# Patient Record
Sex: MALE | Race: BLACK OR AFRICAN AMERICAN | NOT HISPANIC OR LATINO | Employment: FULL TIME | URBAN - METROPOLITAN AREA
[De-identification: names, ages, dates, MRNs, and addresses within clinical notes are randomized per-mention and may not be internally consistent; named-entity substitution may affect disease eponyms.]

---

## 2017-01-11 ENCOUNTER — ANTI-COAG VISIT (OUTPATIENT)
Dept: CARDIOLOGY | Facility: CLINIC | Age: 52
End: 2017-01-11
Payer: MEDICARE

## 2017-01-11 DIAGNOSIS — Z79.01 LONG TERM (CURRENT) USE OF ANTICOAGULANTS: Primary | ICD-10-CM

## 2017-01-11 LAB
CTP QC/QA: NORMAL
INR PPP: 2.4 (ref 2–3)

## 2017-01-11 PROCEDURE — 99999 PR PBB SHADOW E&M-EST. PATIENT-LVL I: CPT | Mod: PBBFAC,,, | Performed by: PHARMACIST

## 2017-01-11 PROCEDURE — 85610 PROTHROMBIN TIME: CPT | Mod: PBBFAC | Performed by: PHARMACIST

## 2017-01-11 PROCEDURE — 99211 OFF/OP EST MAY X REQ PHY/QHP: CPT | Mod: PBBFAC | Performed by: PHARMACIST

## 2017-02-01 ENCOUNTER — ANTI-COAG VISIT (OUTPATIENT)
Dept: CARDIOLOGY | Facility: CLINIC | Age: 52
End: 2017-02-01
Payer: MEDICARE

## 2017-02-01 DIAGNOSIS — Z79.01 LONG TERM (CURRENT) USE OF ANTICOAGULANTS: Primary | ICD-10-CM

## 2017-02-01 LAB — INR PPP: 2.4 (ref 2–3)

## 2017-02-01 PROCEDURE — 99999 PR PBB SHADOW E&M-EST. PATIENT-LVL I: CPT | Mod: PBBFAC,,, | Performed by: PHARMACIST

## 2017-02-01 PROCEDURE — 99211 OFF/OP EST MAY X REQ PHY/QHP: CPT | Mod: PBBFAC | Performed by: PHARMACIST

## 2017-02-01 PROCEDURE — 85610 PROTHROMBIN TIME: CPT | Mod: PBBFAC | Performed by: PHARMACIST

## 2017-03-01 ENCOUNTER — ANTI-COAG VISIT (OUTPATIENT)
Dept: CARDIOLOGY | Facility: CLINIC | Age: 52
End: 2017-03-01
Payer: MEDICARE

## 2017-03-01 DIAGNOSIS — I42.9 CARDIOMYOPATHY, UNSPECIFIED TYPE: Primary | ICD-10-CM

## 2017-03-01 DIAGNOSIS — Z79.01 LONG TERM (CURRENT) USE OF ANTICOAGULANTS: Primary | ICD-10-CM

## 2017-03-01 LAB — INR PPP: 2.3 (ref 2–3)

## 2017-03-01 PROCEDURE — 85610 PROTHROMBIN TIME: CPT | Mod: PBBFAC | Performed by: PHARMACIST

## 2017-03-01 PROCEDURE — 99999 PR PBB SHADOW E&M-EST. PATIENT-LVL I: CPT | Mod: PBBFAC,,, | Performed by: PHARMACIST

## 2017-03-01 PROCEDURE — 99211 OFF/OP EST MAY X REQ PHY/QHP: CPT | Mod: PBBFAC | Performed by: PHARMACIST

## 2017-03-01 NOTE — TELEPHONE ENCOUNTER
----- Message from Ary Jessica sent at 3/1/2017 11:42 AM CST -----  Contact: pt   Pt came in to office to request refills on the following medications: 30 day supply and send to [facility] in [place] (on file)  Dr. Campos/ Rene    Hydralazine 25mg  Isosorbide 20mg    Pt can be reached @ [phone].  Pt scheduled an appt for 4/5/17 w/ Dr. Noe.  Thanks

## 2017-03-02 RX ORDER — HYDRALAZINE HYDROCHLORIDE 25 MG/1
25 TABLET, FILM COATED ORAL EVERY 8 HOURS
Qty: 90 TABLET | Refills: 3 | Status: SHIPPED | OUTPATIENT
Start: 2017-03-02 | End: 2017-04-05 | Stop reason: SDUPTHER

## 2017-03-02 RX ORDER — ISOSORBIDE DINITRATE 20 MG/1
20 TABLET ORAL 3 TIMES DAILY
Qty: 90 TABLET | Refills: 3 | Status: SHIPPED | OUTPATIENT
Start: 2017-03-02 | End: 2017-04-05 | Stop reason: SDUPTHER

## 2017-04-05 ENCOUNTER — HOSPITAL ENCOUNTER (OUTPATIENT)
Dept: CARDIOLOGY | Facility: CLINIC | Age: 52
Discharge: HOME | End: 2017-04-05
Payer: MEDICARE

## 2017-04-05 ENCOUNTER — TELEPHONE (OUTPATIENT)
Dept: CARDIOLOGY | Facility: CLINIC | Age: 52
End: 2017-04-05

## 2017-04-05 ENCOUNTER — ANTI-COAG VISIT (OUTPATIENT)
Dept: CARDIOLOGY | Facility: CLINIC | Age: 52
End: 2017-04-05
Payer: MEDICARE

## 2017-04-05 ENCOUNTER — OFFICE VISIT (OUTPATIENT)
Dept: CARDIOLOGY | Facility: CLINIC | Age: 52
End: 2017-04-05
Payer: MEDICARE

## 2017-04-05 VITALS
BODY MASS INDEX: 36.52 KG/M2 | HEART RATE: 92 BPM | SYSTOLIC BLOOD PRESSURE: 120 MMHG | WEIGHT: 269.63 LBS | HEIGHT: 72 IN | DIASTOLIC BLOOD PRESSURE: 84 MMHG

## 2017-04-05 DIAGNOSIS — E78.00 PURE HYPERCHOLESTEROLEMIA: ICD-10-CM

## 2017-04-05 DIAGNOSIS — Z79.01 ANTICOAGULATED ON COUMADIN: ICD-10-CM

## 2017-04-05 DIAGNOSIS — I63.412 CEREBROVASCULAR ACCIDENT (CVA) DUE TO EMBOLISM OF LEFT MIDDLE CEREBRAL ARTERY: ICD-10-CM

## 2017-04-05 DIAGNOSIS — I43 CARDIOMYOPATHY, HYPERTENSIVE, WITH HEART FAILURE: Primary | ICD-10-CM

## 2017-04-05 DIAGNOSIS — I10 ESSENTIAL HYPERTENSION: ICD-10-CM

## 2017-04-05 DIAGNOSIS — Z79.01 LONG TERM (CURRENT) USE OF ANTICOAGULANTS: Primary | ICD-10-CM

## 2017-04-05 DIAGNOSIS — I48.21 PERMANENT ATRIAL FIBRILLATION: ICD-10-CM

## 2017-04-05 DIAGNOSIS — Z79.01 LONG TERM (CURRENT) USE OF ANTICOAGULANTS: ICD-10-CM

## 2017-04-05 DIAGNOSIS — I11.0 CARDIOMYOPATHY, HYPERTENSIVE, WITH HEART FAILURE: Primary | ICD-10-CM

## 2017-04-05 DIAGNOSIS — I42.9 CARDIOMYOPATHY, UNSPECIFIED TYPE: ICD-10-CM

## 2017-04-05 LAB — INR PPP: 2.4 (ref 2–3)

## 2017-04-05 PROCEDURE — 85610 PROTHROMBIN TIME: CPT | Mod: PBBFAC

## 2017-04-05 PROCEDURE — 99211 OFF/OP EST MAY X REQ PHY/QHP: CPT | Mod: PBBFAC,25,27

## 2017-04-05 PROCEDURE — 99999 PR PBB SHADOW E&M-EST. PATIENT-LVL III: CPT | Mod: PBBFAC,,, | Performed by: INTERNAL MEDICINE

## 2017-04-05 PROCEDURE — 99214 OFFICE O/P EST MOD 30 MIN: CPT | Mod: S$PBB,,, | Performed by: INTERNAL MEDICINE

## 2017-04-05 PROCEDURE — 93010 ELECTROCARDIOGRAM REPORT: CPT | Mod: S$PBB,,, | Performed by: INTERNAL MEDICINE

## 2017-04-05 PROCEDURE — 99999 PR PBB SHADOW E&M-EST. PATIENT-LVL I: CPT | Mod: PBBFAC,,,

## 2017-04-05 RX ORDER — ISOSORBIDE DINITRATE 20 MG/1
20 TABLET ORAL 3 TIMES DAILY
Qty: 90 TABLET | Refills: 11 | Status: SHIPPED | OUTPATIENT
Start: 2017-04-05 | End: 2018-04-25 | Stop reason: SDUPTHER

## 2017-04-05 RX ORDER — HYDRALAZINE HYDROCHLORIDE 25 MG/1
25 TABLET, FILM COATED ORAL EVERY 8 HOURS
Qty: 90 TABLET | Refills: 11 | Status: SHIPPED | OUTPATIENT
Start: 2017-04-05 | End: 2018-05-09 | Stop reason: SDUPTHER

## 2017-04-05 RX ORDER — HYDROCHLOROTHIAZIDE 25 MG/1
25 TABLET ORAL DAILY
Qty: 30 TABLET | Refills: 11 | Status: SHIPPED | OUTPATIENT
Start: 2017-04-05 | End: 2018-04-25 | Stop reason: SDUPTHER

## 2017-04-05 RX ORDER — POTASSIUM CHLORIDE 20 MEQ/1
20 TABLET, EXTENDED RELEASE ORAL DAILY
Qty: 30 TABLET | Refills: 11 | Status: SHIPPED | OUTPATIENT
Start: 2017-04-05

## 2017-04-05 RX ORDER — LISINOPRIL 40 MG/1
40 TABLET ORAL DAILY
Qty: 30 TABLET | Refills: 11 | Status: SHIPPED | OUTPATIENT
Start: 2017-04-05 | End: 2018-04-25 | Stop reason: SDUPTHER

## 2017-04-05 RX ORDER — PRAVASTATIN SODIUM 40 MG/1
40 TABLET ORAL DAILY
Qty: 30 TABLET | Refills: 11 | Status: SHIPPED | OUTPATIENT
Start: 2017-04-05 | End: 2018-04-25 | Stop reason: SDUPTHER

## 2017-04-05 RX ORDER — CARVEDILOL 25 MG/1
12.5 TABLET ORAL 2 TIMES DAILY
Qty: 60 TABLET | Refills: 11 | Status: SHIPPED | OUTPATIENT
Start: 2017-04-05 | End: 2018-04-25 | Stop reason: SDUPTHER

## 2017-04-05 NOTE — TELEPHONE ENCOUNTER
----- Message from Demar Noe MD sent at 4/5/2017  1:02 PM CDT -----  Please contact and inform pt that labs look ok.

## 2017-04-05 NOTE — LETTER
April 5, 2017      Leander Mcmahan MD  [address]  [facility] [address]           [facility]  [address]  [facility] [address]  Phone: [phone]          Patient: Casandra Jaeger   MR Number: [id number]   YOB: 1965   Date of Visit: 4/5/2017       Dear Dr. Leander Mcmhaan:    Thank you for referring Casandra Jaeger to me for evaluation. Attached you will find relevant portions of my assessment and plan of care.    If you have questions, please do not hesitate to call me. I look forward to following Casandra Jaeger along with you.    Sincerely,    Demar Noe MD    Enclosure  CC:  No Recipients    If you would like to receive this communication electronically, please contact [email or web address] or [phone] to request more information on [facility] Link access.    For providers and/or their staff who would like to refer a patient to [facility], please contact us through our one-stop-shop provider referral line, [facility] , at [phone].    If you feel you have received this communication in error or would no longer like to receive these types of communications, please e-mail [email or web address]

## 2017-04-05 NOTE — PROGRESS NOTES
Subjective:   Patient ID:  Casandra Jaeger is a 51 y.o. male who presents for follow-up of Cardiomyopathy (1 yr f/u)      HPI: Pt is a 52 y/o man with a PMH sign for NICM (likely from HTN), permanent afib with CVA (with residual defect) and HPL.  With medical therapy his LVEF has increased from 35%-->50%.  He denies any exertional chest discomfort, exertional dyspnea, palpitations, TIA's, syncope or presyncope. He does not have a regimented exercise program, however does lead an active lifestyle and is not limited by any cardiac symptoms.      Vitals:    04/05/17 0820   BP: 120/84   BP Location: Left arm   Patient Position: Sitting   BP Method: Automatic   Pulse: 92   Weight: 122.3 kg (269 lb 10 oz)   Height: 6' (1.829 m)     Body mass index is 36.57 kg/(m^2).  CrCl cannot be calculated (Patient has no serum creatinine result on file.).    Lab Results   Component Value Date     06/05/2015    K 3.3 (L) 06/05/2015     06/05/2015    CO2 26 06/05/2015    BUN 15 06/05/2015    CREATININE 1.3 06/05/2015     06/05/2015    HGBA1C 5.9 07/31/2009    MG 2.3 08/05/2009    AST 18 11/11/2011    ALT 18 11/11/2011    ALBUMIN 3.7 11/11/2011    PROT 6.8 11/11/2011    BILITOT 1.3 (H) 11/11/2011    WBC 6.12 11/11/2011    HGB 14.4 11/11/2011    HCT 43.0 11/11/2011    MCV 83.3 11/11/2011     11/11/2011    INR 2.3 03/01/2017    INR 1.1 11/15/2011    TSH 0.857 07/31/2009    CHOL 120 07/31/2013    HDL 45 07/31/2013    LDLCALC 59.0 (L) 07/31/2013    TRIG 80 07/31/2013     Current Outpatient Prescriptions   Medication Sig    aspirin 81 mg Tab Take 81 mg by mouth. 1 Tablet Oral Every day    carvedilol (COREG) 25 MG tablet Take 0.5 tablets (12.5 mg total) by mouth 2 (two) times daily.    hydrALAZINE (APRESOLINE) 25 MG tablet Take 1 tablet (25 mg total) by mouth every 8 (eight) hours.    hydrochlorothiazide (HYDRODIURIL) 25 MG tablet Take 1 tablet (25 mg total) by mouth once daily.    isosorbide dinitrate (ISORDIL) 20  MG tablet Take 1 tablet (20 mg total) by mouth 3 (three) times daily.    lisinopril (PRINIVIL,ZESTRIL) 40 MG tablet Take 1 tablet (40 mg total) by mouth once daily.    potassium chloride SA (K-DUR,KLOR-CON) 20 MEQ tablet Take 1 tablet (20 mEq total) by mouth once daily.    pravastatin (PRAVACHOL) 40 MG tablet Take 1 tablet (40 mg total) by mouth once daily.    warfarin (COUMADIN) 2.5 MG tablet Take 2 to 3 tablets by mouth daily as directed by coumadin clinic     No current facility-administered medications for this visit.        Review of Systems   Constitution: Negative for decreased appetite, weakness, malaise/fatigue, weight gain and weight loss.   HENT: Negative for headaches.    Eyes: Negative for visual disturbance.   Cardiovascular: Negative for chest pain, claudication, dyspnea on exertion, irregular heartbeat, near-syncope, orthopnea, palpitations, paroxysmal nocturnal dyspnea and syncope.   Respiratory: Negative for cough, shortness of breath, sleep disturbances due to breathing and snoring.    Skin: Negative for rash.   Musculoskeletal: Negative for arthritis, muscle cramps, muscle weakness and myalgias.   Gastrointestinal: Negative for abdominal pain, anorexia, change in bowel habit, heartburn and nausea.   Genitourinary: Negative for dysuria, frequency and nocturia.   Neurological: Negative for difficulty with concentration, excessive daytime sleepiness, dizziness, light-headedness, loss of balance, numbness, paresthesias and vertigo.   Psychiatric/Behavioral: Negative for depression.       Objective:   Physical Exam   Constitutional: He is oriented to person, place, and time. He appears well-developed and well-nourished.   HENT:   Head: Normocephalic and atraumatic.   Eyes: Pupils are equal, round, and reactive to light.   Neck: Normal range of motion. Neck supple.   Cardiovascular: Normal rate, regular rhythm, normal heart sounds, intact distal pulses and normal pulses.  Exam reveals no gallop.     No murmur heard.  Pulmonary/Chest: Effort normal and breath sounds normal.   Abdominal: Soft. Bowel sounds are normal. There is no hepatosplenomegaly. There is no tenderness.   Musculoskeletal: Normal range of motion.   Neurological: He is alert and oriented to person, place, and time.   Skin: Skin is warm and dry.   Psychiatric: He has a normal mood and affect. His speech is normal and behavior is normal. Judgment and thought content normal.       Assessment:     1. Cardiomyopathy, hypertensive, with heart failure    2. Long term (current) use of anticoagulants    3. Essential hypertension    4. Cerebrovascular accident (CVA) due to embolism of left middle cerebral artery    5. Pure hypercholesterolemia    6. Permanent atrial fibrillation    7. Anticoagulated on Coumadin        Plan:   From a cardiac standpoint, pt is doing well and is clinically stable. Pt's BP's are at goal. Check routine labs.

## 2017-04-05 NOTE — MR AVS SNAPSHOT
[facility]  [address]  [facility] [address]  Phone: [phone]                  Casandra Jaeger   2017 9:30 AM   Office Visit    Description:  Male : 1965   Provider:  Demar Noe MD   Department:  [facility]           Reason for Visit     Cardiomyopathy           Diagnoses this Visit        Comments    Cardiomyopathy, hypertensive, with heart failure    -  Primary     Long term (current) use of anticoagulants         Essential hypertension         Cerebrovascular accident (CVA) due to embolism of left middle cerebral artery         Pure hypercholesterolemia         Permanent atrial fibrillation         Anticoagulated on Coumadin                To Do List           Future Appointments        Provider Department Dept Phone    2017 11:30 AM Loree Arambula, PharmD [facility] - Coumadin [phone]      Goals (5 Years of Data)     None      Follow-Up and Disposition     Return in about 1 year (around 2018).       These Medications        Disp Refills Start End    carvedilol (COREG) 25 MG tablet 60 tablet 11 2017     Take 0.5 tablets (12.5 mg total) by mouth 2 (two) times daily. - Oral    Ph[facility]: [facility] [address] Ph #: [phone]       hydrALAZINE (APRESOLINE) 25 MG tablet 90 tablet 2017     Take 1 tablet (25 mg total) by mouth every 8 (eight) hours. - Oral    Ph[facility]: [facility] [address] Ph #: [phone]       Notes to Pharmacy: Pt requesting 30 day supply.    hydrochlorothiazide (HYDRODIURIL) 25 MG tablet 30 tablet 11 2017     Take 1 tablet (25 mg total) by mouth once daily. - Oral    [facility]: [facility] [address] Ph #: [phone]       isosorbide dinitrate (ISORDIL) 20 MG tablet 90 tablet 2017     Take 1 tablet (20 mg total) by mouth 3 (three) times daily. - Oral    Ph[facility]acy: [facility] [address] [place] [address],  [place] - [address] [facility] #: [phone]       Notes to Pharmacy: Pt requesting a 30 day supply.    lisinopril (PRINIVIL,ZESTRIL) 40 MG tablet 30 tablet 11 4/5/2017     Take 1 tablet (40 mg total) by mouth once daily. - Oral    Ph[facility]acy: [facility] [address] [place] [address] [address] [facility] Ph #: [phone]       potassium chloride SA (K-DUR,KLOR-CON) 20 MEQ tablet 30 tablet 11 4/5/2017     Take 1 tablet (20 mEq total) by mouth once daily. - Oral    Ph[facility]acy: [facility] [address] [place] [address] [address] [facility] Ph #: [phone]       pravastatin (PRAVACHOL) 40 MG tablet 30 tablet 11 4/5/2017     Take 1 tablet (40 mg total) by mouth once daily. - Oral    Pharmacy: [facility] [address] [facility] Ph #: [phone]         [facility]     [facility] Nurse Care Line - 24/7 Assistance  Unless otherwise directed by your provider, please contact [facility], our nurse care line that is available for 24/7 assistance.     Registered nurses in the [facility] provide: appointment scheduling, clinical advisement, health education, and other advisory services.  Call: [phone] (toll free)               Medications           Message regarding Medications     Verify the changes and/or additions to your medication regime listed below are the same as discussed with your clinician today.  If any of these changes or additions are incorrect, please notify your healthcare provider.             Verify that the below list of medications is an accurate representation of the medications you are currently taking.  If none reported, the list may be blank. If incorrect, please contact your healthcare provider. Carry this list with you in case of emergency.           Current Medications     aspirin 81 mg Tab Take 81 mg by mouth. 1 Tablet Oral Every day    carvedilol (COREG) 25 MG tablet Take 0.5 tablets (12.5 mg total) by mouth 2 (two) times daily.    hydrALAZINE (APRESOLINE) 25  MG tablet Take 1 tablet (25 mg total) by mouth every 8 (eight) hours.    hydrochlorothiazide (HYDRODIURIL) 25 MG tablet Take 1 tablet (25 mg total) by mouth once daily.    isosorbide dinitrate (ISORDIL) 20 MG tablet Take 1 tablet (20 mg total) by mouth 3 (three) times daily.    lisinopril (PRINIVIL,ZESTRIL) 40 MG tablet Take 1 tablet (40 mg total) by mouth once daily.    potassium chloride SA (K-DUR,KLOR-CON) 20 MEQ tablet Take 1 tablet (20 mEq total) by mouth once daily.    pravastatin (PRAVACHOL) 40 MG tablet Take 1 tablet (40 mg total) by mouth once daily.    warfarin (COUMADIN) 2.5 MG tablet Take 2 to 3 tablets by mouth daily as directed by coumadin clinic           Clinical Reference Information           Your Vitals Were     BP Pulse Height Weight BMI    120/84 (BP Location: Left arm, Patient Position: Sitting, BP Method: Automatic) 92 6' (1.829 m) 122.3 kg (269 lb 10 oz) 36.57 kg/m2      Blood Pressure          Most Recent Value    Right Arm BP - Sitting  128/91    Left Arm BP - Sitting  120/84    BP  120/84      Allergies as of 4/5/2017     No Known Allergies      Immunizations Administered on Date of Encounter - 4/5/2017     None      Orders Placed During Today's Visit     Future Labs/Procedures Expected by Expires    Comprehensive metabolic panel  4/5/2017 (Approximate) 4/5/2018    Lipid panel  4/5/2017 (Approximate) 4/5/2018      [facility] Sign-Up     Activating your [facility] account is as easy as 1-2-3!     1) Visit [email or web address], select Sign Up Now, enter this activation code and your date of birth, then select Next.  [email or web address]  Expires: 5/20/2017  8:06 AM      2) Create a username and password to use when you visit [facility] in the future and select a security question in case you lose your password and select Next.    3) Enter your e-mail address and click Sign Up!    Additional Information  If you have questions, please e-mail [email or web address] or call [phone] to talk to our  [facility] staff. Remember, [facility] is NOT to be used for urgent needs. For medical emergencies, dial 911.         Language Assistance Services     ATTENTION: Language assistance services are available, free of charge. Please call [phone].      ATENCIÓN: Si habla melbaañol, tiene a mcclelland disposición servicios gratuitos de asistencia lingüística. Llame al [phone].     CHÚ Ý: N?u b?n nói Ti?ng Vi?t, có các d?ch v? h? tr? ngôn ng? mi?n phí dành cho b?n. G?i s? [phone].         [facility] complies with applicable Federal civil rights laws and does not discriminate on the basis of race, color, national origin, age, disability, or sex.

## 2017-04-07 ENCOUNTER — TELEPHONE (OUTPATIENT)
Dept: CARDIOLOGY | Facility: CLINIC | Age: 52
End: 2017-04-07

## 2017-05-17 ENCOUNTER — ANTI-COAG VISIT (OUTPATIENT)
Dept: CARDIOLOGY | Facility: CLINIC | Age: 52
End: 2017-05-17
Payer: MEDICARE

## 2017-05-17 DIAGNOSIS — Z79.01 LONG TERM (CURRENT) USE OF ANTICOAGULANTS: Primary | ICD-10-CM

## 2017-05-17 LAB — INR PPP: 2.4 (ref 2–3)

## 2017-05-17 PROCEDURE — 85610 PROTHROMBIN TIME: CPT | Mod: PBBFAC

## 2017-05-17 NOTE — PROGRESS NOTES
Patient therapeutic. Patient denies any changes in diet, health or medications. Denies overt complications. Endorses consistency with greens 2-3x/wk. Patient may resume current warfarin dose and follow-up in 6 weeks.

## 2017-06-28 ENCOUNTER — ANTI-COAG VISIT (OUTPATIENT)
Dept: CARDIOLOGY | Facility: CLINIC | Age: 52
End: 2017-06-28
Payer: MEDICARE

## 2017-06-28 DIAGNOSIS — I48.20 CHRONIC ATRIAL FIBRILLATION: ICD-10-CM

## 2017-06-28 DIAGNOSIS — Z79.01 LONG-TERM (CURRENT) USE OF ANTICOAGULANTS: ICD-10-CM

## 2017-06-28 DIAGNOSIS — Z79.01 LONG TERM (CURRENT) USE OF ANTICOAGULANTS: Primary | ICD-10-CM

## 2017-06-28 LAB — INR PPP: 1.7 (ref 2–3)

## 2017-06-28 PROCEDURE — 99211 OFF/OP EST MAY X REQ PHY/QHP: CPT | Mod: PBBFAC

## 2017-06-28 PROCEDURE — 99999 PR PBB SHADOW E&M-EST. PATIENT-LVL I: CPT | Mod: PBBFAC,,,

## 2017-06-28 PROCEDURE — 85610 PROTHROMBIN TIME: CPT | Mod: PBBFAC

## 2017-06-28 RX ORDER — WARFARIN 2.5 MG/1
TABLET ORAL
Qty: 100 TABLET | Refills: 3 | Status: SHIPPED | OUTPATIENT
Start: 2017-06-28

## 2017-06-28 NOTE — PROGRESS NOTES
INR subtherapeutic without overt complication, likely due to increased greens.  Patient denies other changes to affect INR.  Boost then rechallenge prior stable warfarin dose.  Monitor INR more closely.  Patient advised to contact clinic with any changes, questions, or concerns.

## 2017-07-26 ENCOUNTER — ANTI-COAG VISIT (OUTPATIENT)
Dept: CARDIOLOGY | Facility: CLINIC | Age: 52
End: 2017-07-26
Payer: MEDICARE

## 2017-07-26 DIAGNOSIS — Z79.01 LONG TERM (CURRENT) USE OF ANTICOAGULANTS: Primary | ICD-10-CM

## 2017-07-26 LAB — INR PPP: 2.5 (ref 2–3)

## 2017-07-26 PROCEDURE — 99211 OFF/OP EST MAY X REQ PHY/QHP: CPT | Mod: PBBFAC

## 2017-07-26 PROCEDURE — 85610 PROTHROMBIN TIME: CPT | Mod: PBBFAC

## 2017-07-26 PROCEDURE — 99999 PR PBB SHADOW E&M-EST. PATIENT-LVL I: CPT | Mod: PBBFAC,,,

## 2017-07-26 NOTE — PROGRESS NOTES
Today is follow up for subtherapeutic INR.  Patient decreased greens to 2-3 times/week and denies other changes to affect INR.  INR therapeutic.  Continue warfarin dose.  Repeat INR 5-6 weeks.  Patient advised to contact clinic with any changes, questions, or concerns.

## 2017-08-21 RX ORDER — HYDRALAZINE HYDROCHLORIDE 25 MG/1
TABLET, FILM COATED ORAL
Qty: 90 TABLET | Refills: 3 | Status: SHIPPED | OUTPATIENT
Start: 2017-08-21 | End: 2018-05-09 | Stop reason: SDUPTHER

## 2017-08-30 ENCOUNTER — ANTI-COAG VISIT (OUTPATIENT)
Dept: CARDIOLOGY | Facility: CLINIC | Age: 52
End: 2017-08-30
Payer: MEDICARE

## 2017-08-30 DIAGNOSIS — Z79.01 LONG TERM (CURRENT) USE OF ANTICOAGULANTS: Primary | ICD-10-CM

## 2017-08-30 LAB — INR PPP: 2.5 (ref 2–3)

## 2017-08-30 PROCEDURE — 99211 OFF/OP EST MAY X REQ PHY/QHP: CPT | Mod: PBBFAC | Performed by: PHARMACIST

## 2017-08-30 PROCEDURE — 99999 PR PBB SHADOW E&M-EST. PATIENT-LVL I: CPT | Mod: PBBFAC,,, | Performed by: PHARMACIST

## 2017-08-30 PROCEDURE — 85610 PROTHROMBIN TIME: CPT | Mod: PBBFAC | Performed by: PHARMACIST

## 2017-10-11 ENCOUNTER — ANTI-COAG VISIT (OUTPATIENT)
Dept: CARDIOLOGY | Facility: CLINIC | Age: 52
End: 2017-10-11
Payer: MEDICARE

## 2017-10-11 DIAGNOSIS — Z79.01 LONG-TERM (CURRENT) USE OF ANTICOAGULANTS: Primary | ICD-10-CM

## 2017-10-11 LAB — INR PPP: 2.3 (ref 2–3)

## 2017-10-11 PROCEDURE — 99211 OFF/OP EST MAY X REQ PHY/QHP: CPT | Mod: PBBFAC | Performed by: PHARMACIST

## 2017-10-11 PROCEDURE — 85610 PROTHROMBIN TIME: CPT | Mod: PBBFAC | Performed by: PHARMACIST

## 2017-10-11 PROCEDURE — 99999 PR PBB SHADOW E&M-EST. PATIENT-LVL I: CPT | Mod: PBBFAC,,, | Performed by: PHARMACIST

## 2017-11-27 ENCOUNTER — ANTI-COAG VISIT (OUTPATIENT)
Dept: CARDIOLOGY | Facility: CLINIC | Age: 52
End: 2017-11-27
Payer: MEDICARE

## 2017-11-27 DIAGNOSIS — Z79.01 LONG-TERM (CURRENT) USE OF ANTICOAGULANTS: Primary | ICD-10-CM

## 2017-11-27 LAB — INR PPP: 2.3 (ref 2–3)

## 2017-11-27 PROCEDURE — 85610 PROTHROMBIN TIME: CPT | Mod: PBBFAC

## 2018-01-24 ENCOUNTER — ANTI-COAG VISIT (OUTPATIENT)
Dept: CARDIOLOGY | Facility: CLINIC | Age: 53
End: 2018-01-24
Payer: MEDICARE

## 2018-01-24 DIAGNOSIS — Z79.01 LONG TERM (CURRENT) USE OF ANTICOAGULANTS: Primary | ICD-10-CM

## 2018-01-24 LAB — INR PPP: 2.2 (ref 2–3)

## 2018-01-24 PROCEDURE — 85610 PROTHROMBIN TIME: CPT | Mod: PBBFAC

## 2018-01-24 NOTE — PROGRESS NOTES
INR within normal range today. Patient with bruise on arm from use. Denies any bleeding or other changes. Patient is stable on this dose. He will continue this dose until follow-up in 6 weeks. I advised him to contact us with any changes or problems.

## 2018-01-24 NOTE — PROGRESS NOTES
Pt seen by Marlena KENNY. I have reviewed her documentation and agree with her assessment and plan.

## 2018-03-07 ENCOUNTER — ANTI-COAG VISIT (OUTPATIENT)
Dept: CARDIOLOGY | Facility: CLINIC | Age: 53
End: 2018-03-07
Payer: MEDICARE

## 2018-03-07 DIAGNOSIS — Z79.01 LONG TERM (CURRENT) USE OF ANTICOAGULANTS: Primary | ICD-10-CM

## 2018-03-07 LAB — INR PPP: 2.1 (ref 2–3)

## 2018-03-07 PROCEDURE — 85610 PROTHROMBIN TIME: CPT | Mod: PBBFAC

## 2018-03-07 NOTE — PROGRESS NOTES
Patient reports no bleeding or bruising, no new medications and no diet changes.  I reminded the patient to call with any problems, changes or questions before the next visit.  I have reviewed the student's initial findings and agree with their assessment.  I have personally spoken with and assessed the patient in clinic to devise care plan.

## 2018-03-19 DIAGNOSIS — Z12.11 COLON CANCER SCREENING: ICD-10-CM

## 2018-04-25 ENCOUNTER — ANTI-COAG VISIT (OUTPATIENT)
Dept: CARDIOLOGY | Facility: CLINIC | Age: 53
End: 2018-04-25
Payer: MEDICARE

## 2018-04-25 DIAGNOSIS — Z79.01 LONG TERM (CURRENT) USE OF ANTICOAGULANTS: Primary | ICD-10-CM

## 2018-04-25 LAB — INR PPP: 2.6 (ref 2–3)

## 2018-04-25 PROCEDURE — 99211 OFF/OP EST MAY X REQ PHY/QHP: CPT | Mod: S$PBB,,,

## 2018-04-25 PROCEDURE — 85610 PROTHROMBIN TIME: CPT | Mod: PBBFAC

## 2018-04-25 RX ORDER — LISINOPRIL 40 MG/1
TABLET ORAL
Qty: 90 TABLET | Refills: 3 | Status: SHIPPED | OUTPATIENT
Start: 2018-04-25 | End: 2018-05-03 | Stop reason: SDUPTHER

## 2018-04-25 RX ORDER — CARVEDILOL 25 MG/1
12.5 TABLET ORAL 2 TIMES DAILY
Qty: 90 TABLET | Refills: 3 | Status: SHIPPED | OUTPATIENT
Start: 2018-04-25

## 2018-04-25 RX ORDER — HYDRALAZINE HYDROCHLORIDE 25 MG/1
TABLET, FILM COATED ORAL
Qty: 270 TABLET | Refills: 3 | Status: SHIPPED | OUTPATIENT
Start: 2018-04-25

## 2018-04-25 RX ORDER — PRAVASTATIN SODIUM 40 MG/1
TABLET ORAL
Qty: 90 TABLET | Refills: 3 | Status: SHIPPED | OUTPATIENT
Start: 2018-04-25

## 2018-04-25 RX ORDER — HYDROCHLOROTHIAZIDE 25 MG/1
TABLET ORAL
Qty: 90 TABLET | Refills: 3 | Status: SHIPPED | OUTPATIENT
Start: 2018-04-25

## 2018-04-25 RX ORDER — ISOSORBIDE DINITRATE 20 MG/1
TABLET ORAL
Qty: 270 TABLET | Refills: 3 | Status: SHIPPED | OUTPATIENT
Start: 2018-04-25

## 2018-04-25 NOTE — PROGRESS NOTES
INR within therapeutic range today. Patient states that he may had a little less high vitamin K foods recently but does not intend on continuing this dietary changes. He denies any bleeding, bruising or other changes that may affect warfarin therapy. Will maintain current dose and monitor closely for upward trend. Patient advised to call coumadin clinic with any changes or concerns

## 2018-05-03 RX ORDER — LISINOPRIL 40 MG/1
40 TABLET ORAL DAILY
Qty: 90 TABLET | Refills: 3 | Status: SHIPPED | OUTPATIENT
Start: 2018-05-03

## 2018-05-09 ENCOUNTER — ANTI-COAG VISIT (OUTPATIENT)
Dept: CARDIOLOGY | Facility: CLINIC | Age: 53
End: 2018-05-09
Payer: MEDICARE

## 2018-05-09 ENCOUNTER — OFFICE VISIT (OUTPATIENT)
Dept: CARDIOLOGY | Facility: CLINIC | Age: 53
End: 2018-05-09
Payer: MEDICARE

## 2018-05-09 VITALS
BODY MASS INDEX: 39.68 KG/M2 | SYSTOLIC BLOOD PRESSURE: 133 MMHG | OXYGEN SATURATION: 97 % | HEIGHT: 72 IN | WEIGHT: 293 LBS | HEART RATE: 92 BPM | DIASTOLIC BLOOD PRESSURE: 97 MMHG

## 2018-05-09 DIAGNOSIS — I11.0 CARDIOMYOPATHY DUE TO HYPERTENSION, WITH HEART FAILURE: ICD-10-CM

## 2018-05-09 DIAGNOSIS — I43 CARDIOMYOPATHY DUE TO HYPERTENSION, WITH HEART FAILURE: ICD-10-CM

## 2018-05-09 DIAGNOSIS — I48.21 PERMANENT ATRIAL FIBRILLATION: ICD-10-CM

## 2018-05-09 DIAGNOSIS — I10 ESSENTIAL HYPERTENSION: ICD-10-CM

## 2018-05-09 DIAGNOSIS — E78.00 PURE HYPERCHOLESTEROLEMIA: ICD-10-CM

## 2018-05-09 DIAGNOSIS — Z79.01 LONG TERM (CURRENT) USE OF ANTICOAGULANTS: Primary | ICD-10-CM

## 2018-05-09 DIAGNOSIS — I48.21 PERMANENT ATRIAL FIBRILLATION: Primary | ICD-10-CM

## 2018-05-09 DIAGNOSIS — Z79.01 LONG TERM (CURRENT) USE OF ANTICOAGULANTS: ICD-10-CM

## 2018-05-09 DIAGNOSIS — I63.412 CEREBROVASCULAR ACCIDENT (CVA) DUE TO EMBOLISM OF LEFT MIDDLE CEREBRAL ARTERY: ICD-10-CM

## 2018-05-09 LAB — INR PPP: 2.4 (ref 2–3)

## 2018-05-09 PROCEDURE — 99214 OFFICE O/P EST MOD 30 MIN: CPT | Mod: PBBFAC | Performed by: INTERNAL MEDICINE

## 2018-05-09 PROCEDURE — 99214 OFFICE O/P EST MOD 30 MIN: CPT | Mod: S$PBB,,, | Performed by: INTERNAL MEDICINE

## 2018-05-09 PROCEDURE — 85610 PROTHROMBIN TIME: CPT | Mod: PBBFAC

## 2018-05-09 PROCEDURE — 99999 PR PBB SHADOW E&M-EST. PATIENT-LVL IV: CPT | Mod: PBBFAC,,, | Performed by: INTERNAL MEDICINE

## 2018-05-09 NOTE — PROGRESS NOTES
.INR within therapeutic range today. Patient denies any bleeding, bruising or other changes that would affect warfarin therapy. Will maintain current dose. Patient advised to call coumadin clinic with any changes or concerns.

## 2018-05-09 NOTE — PROGRESS NOTES
Subjective:   Patient ID:  Casandra Jaeger is a 52 y.o. male who presents for follow-up of Cardiomyopathy (1 yr f/u ) and Wheezing      HPI:   Pt is a 51 y/o man with a PMH sign for NICM (likely from HTN), permanent afib with CVA (with residual defect) and HPL.  With medical therapy his LVEF has increased from 35%-->50%.  He denies any exertional chest discomfort, exertional dyspnea, palpitations, TIA's, syncope or presyncope. He does not have a regimented exercise program, however does lead an active lifestyle and is not limited by any cardiac symptoms.  He has gained a significant amount of weight since last year.    He states he is moving to [facility] in less than 1 month.     Vitals:    05/09/18 0830   BP: (!) 133/97   BP Location: Left arm   Patient Position: Sitting   BP Method: Large (Automatic)   Pulse: 92   SpO2: 97%   Weight: 132.9 kg (292 lb 15.9 oz)   Height: 6' (1.829 m)     Body mass index is 39.74 kg/m².  CrCl cannot be calculated (Patient's most recent lab result is older than the maximum 7 days allowed.).    Lab Results   Component Value Date     04/05/2017    K 4.1 04/05/2017     04/05/2017    CO2 27 04/05/2017    BUN 18 04/05/2017    CREATININE 1.3 04/05/2017    GLU 86 04/05/2017    HGBA1C 5.9 07/31/2009    MG 2.3 08/05/2009    AST 21 04/05/2017    ALT 17 04/05/2017    ALBUMIN 3.7 04/05/2017    PROT 7.0 04/05/2017    BILITOT 1.6 (H) 04/05/2017    WBC 6.12 11/11/2011    HGB 14.4 11/11/2011    HCT 43.0 11/11/2011    MCV 83.3 11/11/2011     11/11/2011    INR 2.6 04/25/2018    INR 1.1 11/15/2011    TSH 0.857 07/31/2009    CHOL 108 (L) 04/05/2017    HDL 38 (L) 04/05/2017    LDLCALC 55.4 (L) 04/05/2017    TRIG 73 04/05/2017       Current Outpatient Prescriptions   Medication Sig    aspirin 81 mg Tab Take 81 mg by mouth. 1 Tablet Oral Every day    carvedilol (COREG) 25 MG tablet Take 0.5 tablets (12.5 mg total) by mouth 2 (two) times daily.    hydrALAZINE (APRESOLINE) 25 MG tablet  TAKE ONE TABLET BY MOUTH EVERY 8 HOURS    hydroCHLOROthiazide (HYDRODIURIL) 25 MG tablet TAKE ONE TABLET BY MOUTH ONCE DAILY    isosorbide dinitrate (ISORDIL) 20 MG tablet TAKE ONE TABLET BY MOUTH THREE TIMES DAILY    lisinopril (PRINIVIL,ZESTRIL) 40 MG tablet Take 1 tablet (40 mg total) by mouth once daily.    potassium chloride SA (K-DUR,KLOR-CON) 20 MEQ tablet Take 1 tablet (20 mEq total) by mouth once daily.    pravastatin (PRAVACHOL) 40 MG tablet TAKE ONE TABLET BY MOUTH ONCE DAILY    warfarin (COUMADIN) 2.5 MG tablet Take 2 to 3 tablets by mouth daily as directed by coumadin clinic     No current facility-administered medications for this visit.        Review of Systems   Constitution: Positive for weight gain. Negative for decreased appetite, weakness, malaise/fatigue and weight loss.   Eyes: Negative for visual disturbance.   Cardiovascular: Negative for chest pain, claudication, dyspnea on exertion, irregular heartbeat, orthopnea, palpitations, paroxysmal nocturnal dyspnea and syncope.   Respiratory: Negative for cough, shortness of breath and snoring.    Skin: Negative for rash.   Musculoskeletal: Negative for arthritis, muscle cramps, muscle weakness and myalgias.   Gastrointestinal: Negative for abdominal pain, anorexia, change in bowel habit and nausea.   Genitourinary: Negative for dysuria and frequency.   Neurological: Negative for excessive daytime sleepiness, dizziness, headaches, loss of balance and numbness.   Psychiatric/Behavioral: Negative for depression.       Objective:   Physical Exam   Constitutional: He is oriented to person, place, and time. He appears well-developed and well-nourished.   HENT:   Head: Normocephalic and atraumatic.   Eyes: Pupils are equal, round, and reactive to light.   Neck: Normal range of motion. Neck supple.   Cardiovascular: Normal rate, normal heart sounds, intact distal pulses and normal pulses.  An irregularly irregular rhythm present. Exam reveals no  gallop.    No murmur heard.  Pulmonary/Chest: Effort normal and breath sounds normal.   Abdominal: Soft. Bowel sounds are normal. There is no hepatosplenomegaly. There is no tenderness.   Musculoskeletal: Normal range of motion.   Neurological: He is alert and oriented to person, place, and time.   Skin: Skin is warm and dry.   Psychiatric: He has a normal mood and affect. His speech is normal and behavior is normal. Judgment and thought content normal.       Assessment:     1. Permanent atrial fibrillation    2. Cardiomyopathy due to hypertension, with heart failure - compensated.  LVEF increased to 50%    3. Pure hypercholesterolemia    4. Essential hypertension    5. Long term (current) use of anticoagulants    6. Cerebrovascular accident (CVA) due to embolism of left middle cerebral artery        Plan:   Pt is clinically stable and from a CHF standpoint, is well compensated.  BPs are slightly elevated today however I'm reluctant to make any changes as he is moving in a few weeks and will be establishing care with new cardiologists.  Last few visits, BPs were ~120/85.  For now continue current meds and establish care in [place].  Recommended weight loss

## 2018-05-09 NOTE — PROGRESS NOTES
Pt seen by Arelis KENNY. I have reviewed her initial findings and agree with her assessment and plan

## 2018-05-28 ENCOUNTER — ANTI-COAG VISIT (OUTPATIENT)
Dept: CARDIOLOGY | Facility: CLINIC | Age: 53
End: 2018-05-28
Payer: MEDICARE

## 2018-05-28 DIAGNOSIS — Z79.01 LONG TERM (CURRENT) USE OF ANTICOAGULANTS: Primary | ICD-10-CM

## 2018-05-28 DIAGNOSIS — I48.21 PERMANENT ATRIAL FIBRILLATION: ICD-10-CM

## 2018-05-28 LAB — INR PPP: 2.2 (ref 2–3)

## 2018-05-28 PROCEDURE — 85610 PROTHROMBIN TIME: CPT | Mod: PBBFAC

## 2018-05-28 NOTE — LETTER
[facility]  [address]  [facility] [address]  Phone: [phone]  Fax: [phone]     2019         Casandra Jaeger  [address]  [address] [place] [id number]    Patient: Casandra Jaeger  MRN: [id number]  : 1965    Dear Casandra Jaeger:  Our records indicate an appointment was missed to have a PT/INR checked.  We have made multiple attempts to contact you by phone and were unsuccessful.  It is extremely important that our clinic is contacted immediately in order to reschedule the missed appointment, as it is important that you are monitored regularly while taking Coumadin (warfarin).  If monitoring by our clinic is no longer required, please call and inform our clinic so that we may update our records and discontinue further attempts to reach you.      Please call the Coumadin Clinic immediately upon receiving this letter, failure to do so may result in discharge from the clinic.  No response to this letter within 7 days will result in discharge from our clinic.      Contact information for the Coumadin Clinic:    Phone: [phone]    Fax: [phone]      Thank You,    [facility] Staff

## 2018-05-28 NOTE — LETTER
March 7, 2019    [address]  [address]  [address] [place] [address]      Dear Mr. Jaeger:    Please contact the office to schedule an appointment with [facility]. You may contact our office at [phone], Monday - Friday, 8am - 5pm to schedule an appointment. It is very important to schedule an appointment to check your INR levels.       Sincerely,    Your Healthcare Team

## 2018-05-28 NOTE — PROGRESS NOTES
.INR within therapeutic range today. Patient denies any bleeding, bruising or other changes that would affect warfarin therapy. Will maintain current dose. Patient advised to call coumadin clinic with any changes or concerns. Patient states that he is moving to [place] on Monday and will be finding a lab to have his PT/INR drawn. Patient states that he will call us with the lab info and would like to be telemanaged by us until he finds a new clinic.

## 2018-05-28 NOTE — PROGRESS NOTES
I have reviewed the nurse's initial findings and agree with their assessment.  I have  assessed the patient in clinic to devise care plan.

## 2018-06-11 ENCOUNTER — ANTI-COAG VISIT (OUTPATIENT)
Dept: CARDIOLOGY CLINIC | Age: 53
End: 2018-06-11

## 2018-06-11 ENCOUNTER — OFFICE VISIT (OUTPATIENT)
Dept: CARDIOLOGY CLINIC | Age: 53
End: 2018-06-11

## 2018-06-11 VITALS
HEART RATE: 72 BPM | OXYGEN SATURATION: 98 % | WEIGHT: 284 LBS | SYSTOLIC BLOOD PRESSURE: 132 MMHG | DIASTOLIC BLOOD PRESSURE: 91 MMHG | HEIGHT: 72 IN | BODY MASS INDEX: 38.47 KG/M2

## 2018-06-11 DIAGNOSIS — I48.91 ATRIAL FIBRILLATION, UNSPECIFIED TYPE (HCC): Primary | ICD-10-CM

## 2018-06-11 LAB
INR BLD: 2.2
PT POC: NORMAL SECONDS
VALID INTERNAL CONTROL?: YES

## 2018-06-11 RX ORDER — CARVEDILOL 25 MG/1
12.5 TABLET ORAL 2 TIMES DAILY
COMMUNITY
Start: 2018-03-07 | End: 2018-08-30 | Stop reason: SDUPTHER

## 2018-06-11 RX ORDER — HYDRALAZINE HYDROCHLORIDE 25 MG/1
25 TABLET, FILM COATED ORAL 3 TIMES DAILY
COMMUNITY
Start: 2018-04-25 | End: 2019-11-08 | Stop reason: SDUPTHER

## 2018-06-11 RX ORDER — ASPIRIN 81 MG/1
81 TABLET ORAL DAILY
COMMUNITY
End: 2021-12-02 | Stop reason: SDUPTHER

## 2018-06-11 RX ORDER — HYDROCHLOROTHIAZIDE 25 MG/1
TABLET ORAL
COMMUNITY
Start: 2018-04-25 | End: 2021-12-01

## 2018-06-11 RX ORDER — PRAVASTATIN SODIUM 40 MG/1
TABLET ORAL
COMMUNITY
Start: 2018-04-25 | End: 2021-12-01

## 2018-06-11 RX ORDER — ISOSORBIDE DINITRATE 20 MG/1
20 TABLET ORAL 3 TIMES DAILY
COMMUNITY
Start: 2018-06-08 | End: 2018-11-20 | Stop reason: ALTCHOICE

## 2018-06-11 RX ORDER — LISINOPRIL 40 MG/1
TABLET ORAL
COMMUNITY
Start: 2018-05-01 | End: 2021-12-02

## 2018-06-11 RX ORDER — WARFARIN 2.5 MG/1
TABLET ORAL
COMMUNITY
Start: 2018-05-29 | End: 2018-10-30 | Stop reason: SDUPTHER

## 2018-06-11 NOTE — PATIENT INSTRUCTIONS
Brandee Carroll will call to schedule echo - Done in [address] [facility] at [facility]

## 2018-06-11 NOTE — PROGRESS NOTES
Cardiovascular Specialists    Mr. Kaushik Boo is a 46year old male with a history of atrial fibrillation, CVA, hypertension, hyperlipidemia and obesity. Mr. Kaushik Boo is here to establish care with me. Mr. Kaushik Boo was diagnosed with atrial fibrillation in 1997. Initially he was not compliant with his medication, especially Coumadin. In 2009 he had a stroke. Since then he's been taking his Coumadin regularly. He has recently relocated to this area from [place]. He used to see a cardiologist there. He said he has some stable dyspnea on exertion for the last couple years. He said he can walk probably 3-4 blocks and after that he gets a little short of breath, however this appears to be somewhat unchanged. He also has gained almost 50 lbs over the last couple years. He denies any presyncope or syncope. He denies any palpitations. He denies any chest pain or chest tightness. He denies any bleeding side effect from Coumadin. Denies any nausea, vomiting, abdominal pain, fever, chills, sputum production. No hematuria or other bleeding complaints    Past Medical History:   Diagnosis Date    [facility]     CVA (cerebral vascular accident) ([facility] 2009    HLD (hyperlipidemia)     HTN (hypertension)     Obesity          No past surgical history on file. Current Outpatient Prescriptions   Medication Sig    carvedilol (COREG) 25 mg tablet 12.5 mg two (2) times a day.  hydrALAZINE (APRESOLINE) 25 mg tablet three (3) times daily.  hydroCHLOROthiazide (HYDRODIURIL) 25 mg tablet     isosorbide dinitrate (ISORDIL) 20 mg tablet 20 mg three (3) times daily.  lisinopril (PRINIVIL, ZESTRIL) 40 mg tablet     pravastatin (PRAVACHOL) 40 mg tablet     warfarin (COUMADIN) 2.5 mg tablet     aspirin delayed-release 81 mg tablet Take  by mouth daily. No current facility-administered medications for this visit.         Allergies and Sensitivities:  No Known Allergies    Family History:  No family history on file. Social History:  Social History   Substance Use Topics    Smoking status: Not on file    Smokeless tobacco: Not on file    Alcohol use Not on file     He  has no tobacco history on file. He  has no alcohol history on file. Review of Systems:  Cardiac symptoms as noted above in HPI. All others negative. Denies fatigue, malaise, skin rash, joint pain, blurring vision, photophobia, neck pain, hemoptysis, chronic cough, nausea, vomiting, hematuria, burning micturition, BRBPR, chronic headaches. Physical Exam:  BP Readings from Last 3 Encounters:   06/11/18 (!) 132/91         Pulse Readings from Last 3 Encounters:   06/11/18 72          Wt Readings from Last 3 Encounters:   06/11/18 284 lb (128.8 kg)       Constitutional: Oriented to person, place, and time. HENT: Head: Normocephalic and atraumatic. Eyes: Conjunctivae and extraocular motions are normal.   Neck: No JVD present. Carotid bruit is not appreciated. Cardiovascular: Irregular rhythm. No murmur, gallop or rubs appreciated  Lung: Breath sounds normal. No respiratory distress. No ronchi or rales appreciated  Abdominal: No tenderness. No rebound and no guarding. Musculoskeletal: There is no lower extremity edema. No cynosis  Lymphadenopathy:  No cervical or supraclavicular adenopathy appriciated. Neurological: No gross motor deficit noted. Skin: No visible skin rash noted. No Ear discharge noted  Psychiatric: Normal mood and affect. Good distal pulse    Review of Data  LABS:   No results found for: NA, K, CL, CO2, GLU, BUN, CREA  No flowsheet data found. No results found for: GPT, ALT  No results found for: HBA1C, HGBE8, QZP3ZJKE, STV2XKYJ    EKG  (06/18) A.fib at 82 bpm.     ECHO    IMPRESSION & PLAN:  Mr. Binta Hines is a 46year old male with a history of atrial fibrillation, CVA, hypertension, hyperlipidemia and obesity. Atrial fibrillation:   This was diagnosed in 1997. This was complicated by a stroke in 2009 as patient was not compliant with Coumadin initially. Currently he's on aspirin and Coumadin. INR today is 2.2. I'm not sure why he's on both aspirin and Coumadin. I'm going to try to obtain records from cardiologist while he was in [place]. He used to see Dr. Rosalva Kate at [facility]. We are going to request records. His heart rate is well controlled. Currently he is taking Coreg. I recommend to continue same. Hypertension: His blood pressure is 132/91 mmHg. He is on Coreg, Hydralazine, Hydrochlorothiazide and Lisinopril, as well as Isosorbide. I am going to continue same. An echocardiogram has been ordered to rule out hypertensive cardiovascular heart disease in the setting of dyspnea to make sure there is no LV dysfunction. Hyperlipidemia:  He is on Pravastatin 40 mg daily. We will obtain fasting lipid profile before next visit. I would recommend to continue same. Obesity:  Mr. Chepe Peralta weighs 284 lbs with BMI of 38. He said he's gained some weight because of sedentary lifestyle. I have advised him to start working on his diet and exercise to lose weight. Goal weight loss of 50 lbs has been discussed. He is going to start working out. Importance of diet and exercise was discussed with patient. This plan was discussed with patient who is in agreement. Thank you for allowing me to participate in patient care. Please feel free to call me if you have any question or concern. Amauri Dixon MD  Please note: This document has been produced using voice recognition software. Unrecognized errors in transcription may be present.

## 2018-06-11 NOTE — MR AVS SNAPSHOT
[address] [facility] [address] [address] 
[phone] Patient: Lilian Mata MRN: [id number] :1965 Visit Information Date & Time Provider Department Dept. Phone Encounter #  
 2018  9:30 AM Pt [address] Fern Christie Specialist at [place] [address] [id number] Your Appointments 2018  9:30 AM  
ANTICOAG NURSE with Pt Inr Norf Csi Cardio Specialist at [facility]: [facility] weeks [facility] [address] Upcoming Health Maintenance Date Due Hepatitis C Screening 1965 DTaP/Tdap/Td series (1 - Tdap) 1986 FOBT Q 1 YEAR AGE 50-75 2015 Influenza Age 5 to Adult 2018 Allergies as of 2018  Review Complete On: 2018 By: Sury Ahumada RN No Known Allergies Current Immunizations  Never Reviewed No immunizations on file. Not reviewed this visit You Were Diagnosed With   
  
 Codes Comments Atrial fibrillation, unspecified type [facility]    -  Primary ICD-10-CM: I48.91 
ICD-9-CM: 427.31 Your Updated Medication List  
  
   
This list is accurate as of 18 10:28 AM.  Always use your most recent med list.  
  
  
  
  
 aspirin delayed-release 81 mg tablet Take  by mouth daily. carvedilol 25 mg tablet Commonly known as:  COREG  
12.5 mg two (2) times a day. hydrALAZINE 25 mg tablet Commonly known as:  APRESOLINE  
three (3) times daily. hydroCHLOROthiazide 25 mg tablet Commonly known as:  HYDRODIURIL  
  
 isosorbide dinitrate 20 mg tablet Commonly known as:  ISORDIL  
20 mg three (3) times daily. lisinopril 40 mg tablet Commonly known as:  PRINIVIL, ZESTRIL  
  
 pravastatin 40 mg tablet Commonly known as:  PRAVACHOL  
  
 warfarin 2.5 mg tablet Commonly known as:  COUMADIN Description Verbal order and read back per Dr. Hurt More Take 7.5mg daily except 5mg on Sunday's June 2018 Details Sun Mon Tue Wed Thu Fri Sat  
       1  
  
  
  
   2  
  
  
  
  
  3  
  
  
  
   4  
  
  
  
   5  
  
  
  
   6  
  
  
  
   7  
  
  
  
   8  
  
  
  
   9  
  
  
  
  
  10  
  
  
  
   11  
  
7.5 mg  
See details 12  
  
7.5 mg  
  
   13  
  
7.5 mg  
  
   14  
  
7.5 mg  
  
   15  
  
7.5 mg  
  
   16  
  
7.5 mg  
  
  
  17  
  
5 mg  
  
   18  
  
7.5 mg  
  
   19  
  
7.5 mg  
  
   20  
  
7.5 mg  
  
   21  
  
7.5 mg  
  
   22  
  
7.5 mg  
  
   23  
  
7.5 mg  
  
  
  24  
  
5 mg  
  
   25  
  
7.5 mg  
  
   26  
  
7.5 mg  
  
   27  
  
7.5 mg  
  
   28  
  
7.5 mg  
  
   29  
  
7.5 mg  
  
   30  
  
7.5 mg Date Details 06/11 This INR check INR: 2.2 How to take your warfarin dose To take:  5 mg Take two of the 2.5 mg tablets. To take:  7.5 mg Take three of the 2.5 mg tablets. July 2018 Details Sun Mon Tue Wed Thu Fri Sat  
  1  
  
5 mg  
  
   2  
  
7.5 mg  
  
   3  
  
  
  
   4  
  
  
  
   5  
  
  
  
   6  
  
  
  
   7  
  
  
  
  
  8  
  
  
  
   9  
  
  
  
   10  
  
  
  
   11  
  
  
  
   12  
  
  
  
   13  
  
  
  
   14  
  
  
  
  
  15  
  
  
  
   16  
  
  
  
   17  
  
  
  
   18  
  
  
  
   19  
  
  
  
   20  
  
  
  
   21  
  
  
  
  
  22  
  
  
  
   23  
  
  
  
   24  
  
  
  
   25  
  
  
  
   26  
  
  
  
   27  
  
  
  
   28  
  
  
  
  
  29  
  
  
  
   30  
  
  
  
   31  
  
  
  
      
 Date Details No additional details Date of next INR:  7/2/2018 How to take your warfarin dose To take:  5 mg Take two of the 2.5 mg tablets. To take:  7.5 mg Take three of the 2.5 mg tablets. We Performed the Following AMB POC PT/INR [04947 CPT(R)] Introducing [facility] & HEALTH SERVICES! [facility] introduces Keep[employer]ipes patient portal. Now you can access parts of your medical record, email your doctor's office, and request medication refills online. 1. In your internet browser, go to [email or web address] 2. Click on the First Time User? Click Here link in the Sign In box. You will see the New Member Sign Up page. 3. Enter your KeepRecipes Access Code exactly as it appears below. You will not need to use this code after youve completed the sign-up process. If you do not sign up before the expiration date, you must request a new code. · Keep[employer]ipes Access Code: [email or web address] Expires: 9/9/2018 10:13 AM 
 
4. Enter the last four digits of your Social Security Number (xxxx) and Date of Birth (mm/dd/yyyy) as indicated and click Submit. You will be taken to the next sign-up page. 5. Create a KeepRecipes ID. This will be your KeepRecipes login ID and cannot be changed, so think of one that is secure and easy to remember. 6. Create a KeepRecipes password. You can change your password at any time. 7. Enter your Password Reset Question and Answer. This can be used at a later time if you forget your password. 8. Enter your e-mail address. You will receive e-mail notification when new information is available in [address] 9. Click Sign Up. You can now view and download portions of your medical record. 10. Click the Download Summary menu link to download a portable copy of your medical information. If you have questions, please visit the Frequently Asked Questions section of the KeepRecipes website. Remember, KeepRecipes is NOT to be used for urgent needs. For medical emergencies, dial 911. Now available from your iPhone and Android! Please provide this summary of care documentation to your next provider. If you have any questions after today's visit, please call [phone].

## 2018-06-11 NOTE — MR AVS SNAPSHOT
[address] [facility] [address] [address] 
[phone] Patient: Krista Vale MRN: [id number] :1965 Visit Information Date & Time Provider Department Dept. Phone Encounter #  
 2018 10:00 AM Black Weiss  [facility] Specialist at [facility] [phone] [id number] Follow-up Instructions Return in about 3 months (around 2018). Your Appointments 2018  9:30 AM  
ANTICOAG NURSE with Pt Inr Norf Csi Cardio Specialist at [facility]) Appt Note: 3 weeks [facility] [address] Upcoming Health Maintenance Date Due Hepatitis C Screening 1965 DTaP/Tdap/Td series (1 - Tdap) 1986 FOBT Q 1 YEAR AGE 50-75 2015 Influenza Age 5 to Adult 2018 Allergies as of 2018  Review Complete On: 2018 By: Alma Ocampo RN No Known Allergies Current Immunizations  Never Reviewed No immunizations on file. Not reviewed this visit You Were Diagnosed With   
  
 Codes Comments Atrial fibrillation, unspecified type ([facility]    -  Primary ICD-10-CM: I48.91 
ICD-9-CM: 427.31 Vitals BP Pulse Height(growth percentile) Weight(growth percentile) SpO2 BMI  
 (!) 132/91 72 6' (1.829 m) 284 lb (128.8 kg) 98% 38.52 kg/m2 BMI and BSA Data Body Mass Index Body Surface Area 38.52 kg/m 2 2.56 m 2 Your Updated Medication List  
  
   
This list is accurate as of 18 10:28 AM.  Always use your most recent med list.  
  
  
  
  
 aspirin delayed-release 81 mg tablet Take  by mouth daily. carvedilol 25 mg tablet Commonly known as:  COREG  
12.5 mg two (2) times a day. hydrALAZINE 25 mg tablet Commonly known as:  APRESOLINE  
three (3) times daily. hydroCHLOROthiazide 25 mg tablet Commonly known as:  HYDRODIURIL  
  
 isosorbide dinitrate 20 mg tablet Commonly known as:  ISORDIL  
20 mg three (3) times daily. lisinopril 40 mg tablet Commonly known as:  PRINIVIL, ZESTRIL  
  
 pravastatin 40 mg tablet Commonly known as:  PRAVACHOL  
  
 warfarin 2.5 mg tablet Commonly known as:  COUMADIN We Performed the Following AMB POC EKG ROUTINE W/ 12 LEADS, INTER & REP [67008 CPT(R)] Follow-up Instructions Return in about 3 months (around 9/11/2018). Patient Instructions Jaclyn will call to schedule echo - Done in Building [facility] at [facility] Introducing [facility] [place] & HEALTH SERVICES! [facility] introduces [employer] patient portal. Now you can access parts of your medical record, email your doctor's office, and request medication refills online. 1. In your internet browser, go to [email or web address] 2. Click on the First Time User? Click Here link in the Sign In box. You will see the New Member Sign Up page. 3. Enter your Push Health Access Code exactly as it appears below. You will not need to use this code after youve completed the sign-up process. If you do not sign up before the expiration date, you must request a new code. · [employer] Access Code: [email or web address] Expires: 9/9/2018 10:13 AM 
 
4. Enter the last four digits of your Social Security Number (xxxx) and Date of Birth (mm/dd/yyyy) as indicated and click Submit. You will be taken to the next sign-up page. 5. Create a Narratot ID. This will be your Push Health login ID and cannot be changed, so think of one that is secure and easy to remember. 6. Create a [employer] password. You can change your password at any time. 7. Enter your Password Reset Question and Answer. This can be used at a later time if you forget your password. 8. Enter your e-mail address.  You will receive e-mail notification when new information is available in [employer]. 9. Click Sign Up. You can now view and download portions of your medical record. 10. Click the Download Summary menu link to download a portable copy of your medical information. If you have questions, please visit the Frequently Asked Questions section of the [employer]o website. Remember, [employer]o is NOT to be used for urgent needs. For medical emergencies, dial 911. Now available from your iPhone and Android! Please provide this summary of care documentation to your next provider. If you have any questions after today's visit, please call [phone].

## 2018-07-02 ENCOUNTER — ANTI-COAG VISIT (OUTPATIENT)
Dept: CARDIOLOGY CLINIC | Age: 53
End: 2018-07-02

## 2018-07-02 DIAGNOSIS — I48.91 ATRIAL FIBRILLATION, UNSPECIFIED TYPE (HCC): ICD-10-CM

## 2018-07-02 LAB
INR BLD: 2.4
PT POC: NORMAL SECONDS
VALID INTERNAL CONTROL?: YES

## 2018-07-02 NOTE — MR AVS SNAPSHOT
[address] [facility] [address] [address] 
[phone] Patient: Naomi Carrizales MRN: [id number] :1965 Visit Information Date & Time Provider Department Dept. Phone Encounter #  
 2018  9:30 AM Pt [address] Fern Christie Specialist at [place] [address] [id number] Your Appointments 2018  9:30 AM  
ANTICOAG NURSE with Pt Inr Norf Csi Cardio Specialist at [address]) Appt Note: 3 weeks [facility] [address] [phone]  
  
   
 [facility] [address]  
  
    
 2018  9:20 AM  
ANTICOAG NURSE with Pt Inr Norf Csi Cardio Specialist at [address]) Appt Note: 4 weeks [facility] [address] [address]  
[phone] Upcoming Health Maintenance Date Due Hepatitis C Screening 1965 DTaP/Tdap/Td series (1 - Tdap) 1986 FOBT Q 1 YEAR AGE 50-75 2015 MEDICARE YEARLY EXAM 2018 Influenza Age 5 to Adult 2018 Allergies as of 2018  Review Complete On: 2018 By: Pastor Stephenie RN No Known Allergies Current Immunizations  Never Reviewed No immunizations on file. Not reviewed this visit You Were Diagnosed With   
  
 Codes Comments Atrial fibrillation, unspecified type ([facility]     ICD-10-CM: I48.91 
ICD-9-CM: 427.31 Your Updated Medication List  
  
   
This list is accurate as of 18  9:20 AM.  Always use your most recent med list.  
  
  
  
  
 aspirin delayed-release 81 mg tablet Take  by mouth daily. carvedilol 25 mg tablet Commonly known as:  COREG  
12.5 mg two (2) times a day. hydrALAZINE 25 mg tablet Commonly known as:  APRESOLINE  
three (3) times daily. hydroCHLOROthiazide 25 mg tablet Commonly known as:  HYDRODIURIL  
  
 isosorbide dinitrate 20 mg tablet Commonly known as:  ISORDIL  
20 mg three (3) times daily. lisinopril 40 mg tablet Commonly known as:  PRINIVIL, ZESTRIL  
  
 pravastatin 40 mg tablet Commonly known as:  PRAVACHOL  
  
 warfarin 2.5 mg tablet Commonly known as:  COUMADIN Description Verbal order and read back per Dr. Fred Lua Take 7.5mg daily except 5mg on Sunday's July 2018 Details Sissy Buster Tue Wed Thu Fri Sat  
  1  
  
  
  
   2  
  
7.5 mg  
See details 3  
  
7.5 mg  
  
   4  
  
7.5 mg  
  
   5  
  
7.5 mg  
  
   6  
  
7.5 mg  
  
   7  
  
7.5 mg  
  
  
  8  
  
5 mg  
  
   9  
  
7.5 mg  
  
   10  
  
7.5 mg  
  
   11  
  
7.5 mg  
  
   12  
  
7.5 mg  
  
   13  
  
7.5 mg  
  
   14  
  
7.5 mg  
  
  
  15  
  
5 mg  
  
   16  
  
7.5 mg  
  
   17  
  
7.5 mg  
  
   18  
  
7.5 mg  
  
   19  
  
7.5 mg  
  
   20  
  
7.5 mg  
  
   21  
  
7.5 mg  
  
  
  22  
  
5 mg  
  
   23  
  
7.5 mg  
  
   24  
  
7.5 mg  
  
   25  
  
7.5 mg  
  
   26  
  
7.5 mg  
  
   27  
  
7.5 mg  
  
   28  
  
7.5 mg  
  
  
  29  
  
5 mg  
  
   30  
  
7.5 mg  
  
   31 Date Details 07/02 This INR check INR: 2.4 Date of next INR:  7/30/2018 How to take your warfarin dose To take:  5 mg Take two of the 2.5 mg tablets. To take:  7.5 mg Take three of the 2.5 mg tablets. We Performed the Following AMB POC PT/INR [99802 CPT(R)] To-Do List   
 07/06/2018 2:00 PM  
  Appointment with [address] [place] [address] 7000 [facility] CV SERV at [address] [place] [address] NON-INVASIVE [address] [place] [address] ([phone]) Patient needs to bring a current list of all medications  No preparation is required for this study  This study requires patient to bring a written physicians order or the MD office may fax the order to Central Scheduling at [phone].   This exam is performed in the [address] [place] [address] at [facility] in the echo department. Please have the patient arrive 15 minutes prior to their schedule appointment time and report to Patient Registration at the main entrance of the hospital.     AGE LIMIT for this procedure at [facility] is 25years old. All patients under 25years of age should be referred to [facility]. Introducing \[facility] [place] [facility]\"" & HEALTH SERVICES! [employer] introduces [employer] patient portal. Now you can access parts of your medical record, email your doctor's office, and request medication refills online. 1. In your internet browser, go to [email or web address] 2. Click on the First Time User? Click Here link in the Sign In box. You will see the New Member Sign Up page. 3. Enter your [employer] Access Code exactly as it appears below. You will not need to use this code after youve completed the sign-up process. If you do not sign up before the expiration date, you must request a new code. · [employer] Access Code: [email or web address] Expires: 9/9/2018 10:13 AM 
 
4. Enter the last four digits of your Social Security Number (xxxx) and Date of Birth (mm/dd/yyyy) as indicated and click Submit. You will be taken to the next sign-up page. 5. Create a [employer] ID. This will be your [employer] login ID and cannot be changed, so think of one that is secure and easy to remember. 6. Create a [employer] password. You can change your password at any time. 7. Enter your Password Reset Question and Answer. This can be used at a later time if you forget your password. 8. Enter your e-mail address. You will receive e-mail notification when new information is available in [address] 9. Click Sign Up. You can now view and download portions of your medical record. 10. Click the Download Summary menu link to download a portable copy of your medical information.  
 
If you have questions, please visit the Frequently Asked Questions section of the [employer]is[employer]I. Remember, [employer] is NOT to be used for urgent needs. For medical emergencies, dial 911. Now available from your iPhone and Android! Please provide this summary of care documentation to your next provider. Your primary care clinician is listed as PROVIDER UNKNOWN. If you have any questions after today's visit, please call [phone].

## 2018-07-06 ENCOUNTER — HOSPITAL ENCOUNTER (OUTPATIENT)
Dept: NON INVASIVE DIAGNOSTICS | Age: 53
Discharge: HOME | End: 2018-07-06
Attending: INTERNAL MEDICINE
Payer: MEDICARE

## 2018-07-06 VITALS
WEIGHT: 275 LBS | BODY MASS INDEX: 40.73 KG/M2 | SYSTOLIC BLOOD PRESSURE: 122 MMHG | DIASTOLIC BLOOD PRESSURE: 81 MMHG | HEIGHT: 69 IN

## 2018-07-06 DIAGNOSIS — I48.91 ATRIAL FIBRILLATION, UNSPECIFIED TYPE (HCC): ICD-10-CM

## 2018-07-06 LAB
ECHO AV PEAK GRADIENT: 0 MMHG
ECHO AV PEAK VELOCITY: 0 CM/S
ECHO LA VOL 2C: 92.17 ML (ref 18–58)
ECHO LA VOL 4C: 114.48 ML (ref 18–58)
ECHO LA VOLUME INDEX A2C: 38.97 ML/M2
ECHO LA VOLUME INDEX A4C: 48.41 ML/M2
ECHO LV EDV A4C: 119.8 ML
ECHO LV EDV INDEX A4C: 50.7 ML/M2
ECHO LV EJECTION FRACTION A4C: 70 %
ECHO LV ESV A4C: 35.6 ML
ECHO LV ESV INDEX A4C: 15.1 ML/M2
ECHO LV INTERNAL DIMENSION DIASTOLIC: 5.5 CM (ref 4.2–5.9)
ECHO LV INTERNAL DIMENSION SYSTOLIC: 3.99 CM
ECHO LV ISOVOLUMETRIC RELAXATION TIME (IVRT): 49.9 MS
ECHO LV IVSD: 1.12 CM (ref 0.6–1)
ECHO LV MASS 2D: 325.8 G (ref 88–224)
ECHO LV MASS INDEX 2D: 137.8 G/M2
ECHO LV POSTERIOR WALL DIASTOLIC: 1.28 CM (ref 0.6–1)
ECHO LVOT DIAM: 2.58 CM
ECHO LVOT PEAK GRADIENT: 2.2 MMHG
ECHO LVOT PEAK VELOCITY: 73.4 CM/S
ECHO LVOT VTI: 11.63 CM
ECHO MV A VELOCITY: 0.07 CM/S
ECHO MV AREA PHT: 9.8 CM2
ECHO MV E DECELERATION TIME (DT): 77.7 MS
ECHO MV E VELOCITY: 0.66 CM/S
ECHO MV E/A RATIO: 992.2
ECHO MV PRESSURE HALF TIME (PHT): 22.5 MS

## 2018-07-06 PROCEDURE — 93306 TTE W/DOPPLER COMPLETE: CPT

## 2018-07-30 ENCOUNTER — ANTI-COAG VISIT (OUTPATIENT)
Dept: CARDIOLOGY CLINIC | Age: 53
End: 2018-07-30

## 2018-07-30 DIAGNOSIS — I48.91 ATRIAL FIBRILLATION, UNSPECIFIED TYPE (HCC): ICD-10-CM

## 2018-07-30 LAB
INR BLD: 2
PT POC: 23.6 SECONDS
VALID INTERNAL CONTROL?: YES

## 2018-07-30 NOTE — MR AVS SNAPSHOT
[address] [facility] [address] [address] 
[phone] Patient: Jeanette Dinero MRN: [id number] :1965 Visit Information Date & Time Provider Department Dept. Phone Encounter #  
 2018  9:20 AM Pt Inr Norf Csi Cardio Specialist at [place] [address] [id number] Follow-up Instructions Return in about 4 weeks (around 2018). Your Appointments 2018  9:00 AM  
ANTICOAG NURSE with Pt Inr Norf Csi Cardio Specialist at [facility]) Appt Note: f/u INR therapeutic [facility] [address] [address] [address] [place] [address] Upcoming Health Maintenance Date Due Hepatitis C Screening 1965 DTaP/Tdap/Td series (1 - Tdap) 1986 FOBT Q 1 YEAR AGE 50-75 2015 MEDICARE YEARLY EXAM 2018 Influenza Age 5 to Adult 2018 Allergies as of 2018  Review Complete On: 2018 By: Lelo Gaitan LPN No Known Allergies Current Immunizations  Never Reviewed No immunizations on file. Not reviewed this visit You Were Diagnosed With   
  
 Codes Comments [facility]     ICD-10-CM: I48.91 
ICD-9-CM: 427.31 Your Updated Medication List  
  
   
This list is accurate as of 18  9:24 AM.  Always use your most recent med list.  
  
  
  
  
 aspirin delayed-release 81 mg tablet Take  by mouth daily. carvedilol 25 mg tablet Commonly known as:  COREG  
12.5 mg two (2) times a day. hydrALAZINE 25 mg tablet Commonly known as:  APRESOLINE  
three (3) times daily. hydroCHLOROthiazide 25 mg tablet Commonly known as:  HYDRODIURIL  
  
 isosorbide dinitrate 20 mg tablet Commonly known as:  ISORDIL  
20 mg three (3) times daily. lisinopril 40 mg tablet Commonly known as:  PRINIVIL, ZESTRIL  
  
 pravastatin 40 mg tablet Commonly known as:  PRAVACHOL  
  
 warfarin 2.5 mg tablet Commonly known as:  COUMADIN Description Verbal order and read back per Dr. Nohemi Judge Take 7.5mg daily except 5mg on Sunday's Recheck in 1 month July 2018 Details Sun Mon Tue Wed Thu Fri Sat  
  1  
  
  
  
   2  
  
  
  
   3  
  
  
  
   4  
  
  
  
   5  
  
  
  
   6  
  
  
  
   7  
  
  
  
  
  8  
  
  
  
   9  
  
  
  
   10  
  
  
  
   11  
  
  
  
   12  
  
  
  
   13  
  
  
  
   14  
  
  
  
  
  15  
  
  
  
   16  
  
  
  
   17  
  
  
  
   18  
  
  
  
   19  
  
  
  
   20  
  
  
  
   21  
  
  
  
  
  22  
  
  
  
   23  
  
  
  
   24  
  
  
  
   25  
  
  
  
   26  
  
  
  
   27  
  
  
  
   28  
  
  
  
  
  29  
  
  
  
   30  
  
7.5 mg  
See details 31  
  
7.5 mg Date Details 07/30 This INR check INR: 2.0 How to take your warfarin dose To take:  7.5 mg Take three of the 2.5 mg tablets. August 2018 Details Luisa Hoover Tue Wed Thu Fri Sat  
     1  
  
7.5 mg  
  
   2  
  
7.5 mg  
  
   3  
  
7.5 mg  
  
   4  
  
7.5 mg  
  
  
  5  
  
5 mg  
  
   6  
  
7.5 mg  
  
   7  
  
7.5 mg  
  
   8  
  
7.5 mg  
  
   9  
  
7.5 mg  
  
   10  
  
7.5 mg  
  
   11  
  
7.5 mg  
  
  
  12  
  
5 mg  
  
   13  
  
7.5 mg  
  
   14  
  
7.5 mg  
  
   15  
  
7.5 mg  
  
   16  
  
7.5 mg  
  
   17  
  
7.5 mg  
  
   18  
  
7.5 mg  
  
  
  19  
  
5 mg  
  
   20  
  
7.5 mg  
  
   21  
  
7.5 mg  
  
   22  
  
7.5 mg  
  
   23  
  
7.5 mg  
  
   24  
  
7.5 mg  
  
   25  
  
7.5 mg  
  
  
  26  
  
5 mg  
  
   27  
  
7.5 mg  
  
   28  
  
7.5 mg  
  
   29  
  
7.5 mg  
  
   30  
  
  
  
   31  
  
  
  
   
 Date Details No additional details Date of next INR:  8/29/2018 How to take your warfarin dose To take:  5 mg Take two of the 2.5 mg tablets. To take:  7.5 mg Take three of the 2.5 mg tablets. We Performed the Following AMB POC PT/INR [04375 CPT(R)] Follow-up Instructions Return in about 4 weeks (around 8/27/2018). Patient Instructions Continue same dose of coumadin and recheck in 1 mointh. Introducing [facility]! [employer] introduces [employer] patient portal. Now you can access parts of your medical record, email your doctor's office, and request medication refills online. 1. In your internet browser, go to [email or web address] 2. Click on the First Time User? Click Here link in the Sign In box. You will see the New Member Sign Up page. 3. Enter your Play Meg[employer] Access Code exactly as it appears below. You will not need to use this code after youve completed the sign-up process. If you do not sign up before the expiration date, you must request a new code. · Play Meg[employer] Access Code: [email or web address] Expires: 9/9/2018 10:13 AM 
 
4. Enter the last four digits of your Social Security Number (xxxx) and Date of Birth (mm/dd/yyyy) as indicated and click Submit. You will be taken to the next sign-up page. 5. Create a Play Megaphone ID. This will be your Play Megaphone login ID and cannot be changed, so think of one that is secure and easy to remember. 6. Create a Play Megaphone password. You can change your password at any time. 7. Enter your Password Reset Question and Answer. This can be used at a later time if you forget your password. 8. Enter your e-mail address. You will receive e-mail notification when new information is available in [address] 9. Click Sign Up. You can now view and download portions of your medical record. 10. Click the Download Summary menu link to download a portable copy of your medical information.  
 
If you have questions, please visit the Frequently Asked Questions section of the [employer] Remember, ESTmobt is NOT to be used for urgent needs. For medical emergencies, dial 911. Now available from your iPhone and Android! Please provide this summary of care documentation to your next provider. Your primary care clinician is listed as NONE. If you have any questions after today's visit, please call [phone].

## 2018-08-30 ENCOUNTER — OFFICE VISIT (OUTPATIENT)
Dept: CARDIOLOGY CLINIC | Age: 53
End: 2018-08-30

## 2018-08-30 ENCOUNTER — ANTI-COAG VISIT (OUTPATIENT)
Dept: CARDIOLOGY CLINIC | Age: 53
End: 2018-08-30

## 2018-08-30 VITALS
DIASTOLIC BLOOD PRESSURE: 76 MMHG | HEART RATE: 81 BPM | HEIGHT: 69 IN | WEIGHT: 284 LBS | BODY MASS INDEX: 42.06 KG/M2 | OXYGEN SATURATION: 97 % | SYSTOLIC BLOOD PRESSURE: 127 MMHG

## 2018-08-30 DIAGNOSIS — I48.91 ATRIAL FIBRILLATION, UNSPECIFIED TYPE (HCC): ICD-10-CM

## 2018-08-30 DIAGNOSIS — E78.5 HYPERLIPIDEMIA, UNSPECIFIED HYPERLIPIDEMIA TYPE: ICD-10-CM

## 2018-08-30 DIAGNOSIS — Z76.89 ESTABLISHING CARE WITH NEW DOCTOR, ENCOUNTER FOR: Primary | ICD-10-CM

## 2018-08-30 PROBLEM — E66.01 OBESITY, MORBID (HCC): Status: ACTIVE | Noted: 2018-08-30

## 2018-08-30 LAB
INR BLD: 1.9
PT POC: 22.6 SECONDS
VALID INTERNAL CONTROL?: YES

## 2018-08-30 RX ORDER — CARVEDILOL 25 MG/1
12.5 TABLET ORAL 2 TIMES DAILY
Qty: 180 TAB | Refills: 3 | Status: SHIPPED | OUTPATIENT
Start: 2018-08-30 | End: 2018-09-04 | Stop reason: SDUPTHER

## 2018-08-30 NOTE — PATIENT INSTRUCTIONS
Fani Zepeda    Physician           Specialty Languages     Internal Medicine ENGLISH       Primary Contact Information      Phone Fax E-mail Address     [phone] Not available. Lorri       Lipid panel - fasting 12 -15 hours prior ! Water only.  Done in [address]

## 2018-08-30 NOTE — MR AVS SNAPSHOT
[address] [facility] [address] [address] 
[phone] Patient: Janey Oconnor MRN: [id number] :1965 Visit Information Date & Time Provider Department Dept. Phone Encounter #  
 2018  8:30 AM Karli Stanton. Adonay Quezada Specialist at [facility] [phone] Follow-up Instructions Return in about 3 months (around 2018). Your Appointments 2018  9:20 AM  
ANTICOAG NURSE with Pt Inr Norf Csi Cardio Specialist at [address]) Appt Note: one month [facility] [address] [address] [address] [place] [address]  
  
    
 2018  9:45 AM  
Follow Up with Bhakti Aguila MD  
Cardio Specialist at [address]) Appt Note: 3 months follow up  
 [facility] [address] [address] [address] [place] [address] Upcoming Health Maintenance Date Due Hepatitis C Screening 1965 DTaP/Tdap/Td series (1 - Tdap) 1986 FOBT Q 1 YEAR AGE 50-75 2015 MEDICARE YEARLY EXAM 2018 Influenza Age 5 to Adult 2018 Allergies as of 2018  Review Complete On: 2018 By: Prema Barrios LPN No Known Allergies Current Immunizations  Never Reviewed No immunizations on file. Not reviewed this visit You Were Diagnosed With   
  
 Codes Comments Establishing care with new doctor, encounter for    -  Primary ICD-10-CM: Z76.89 
ICD-9-CM: V65.8 Vitals BP Pulse Height(growth percentile) Weight(growth percentile) SpO2 BMI  
 127/76 81 5' 9\" (1.753 m) 284 lb (128.8 kg) 97% 41.94 kg/m2 Smoking Status Unknown If Ever Smoked Vitals History BMI and BSA Data Body Mass Index Body Surface Area  41.94 kg/m 2 2.5 m 2  
  
  
 Your Updated Medication List  
  
   
This list is accurate as of 8/30/18  9:01 AM.  Always use your most recent med list.  
  
  
  
  
 aspirin delayed-release 81 mg tablet Take  by mouth daily. carvedilol 25 mg tablet Commonly known as:  COREG  
12.5 mg two (2) times a day. hydrALAZINE 25 mg tablet Commonly known as:  APRESOLINE  
three (3) times daily. hydroCHLOROthiazide 25 mg tablet Commonly known as:  HYDRODIURIL  
  
 isosorbide dinitrate 20 mg tablet Commonly known as:  ISORDIL  
20 mg three (3) times daily. lisinopril 40 mg tablet Commonly known as:  PRINIVIL, ZESTRIL  
  
 pravastatin 40 mg tablet Commonly known as:  PRAVACHOL  
  
 warfarin 2.5 mg tablet Commonly known as:  COUMADIN Follow-up Instructions Return in about 3 months (around 11/30/2018). Patient Instructions Nguyen Phillips Physician  
  
  
  Specialty Languages  
  Internal Medicine ENGLISH  
   
Primary Contact Information   
  Phone Fax E-mail Address  
  [phone] Not available. [facility] Lipid panel - fasting 12 -15 hours prior ! Water only. Done in [address] Introducing [address]! [employer] introduces [facility] patient portal. Now you can access parts of your medical record, email your doctor's office, and request medication refills online. 1. In your internet browser, go to [email or web address] 2. Click on the First Time User? Click Here link in the Sign In box. You will see the New Member Sign Up page. 3. Enter your [facility] Access Code exactly as it appears below. You will not need to use this code after youve completed the sign-up process. If you do not sign up before the expiration date, you must request a new code. · [facility] Access Code: [email or web address] Expires: 9/9/2018 10:13 AM 
 
 4. Enter the last four digits of your Social Security Number (xxxx) and Date of Birth (mm/dd/yyyy) as indicated and click Submit. You will be taken to the next sign-up page. 5. Create a [employer] ID. This will be your [employer] login ID and cannot be changed, so think of one that is secure and easy to remember. 6. Create a [employer] password. You can change your password at any time. 7. Enter your Password Reset Question and Answer. This can be used at a later time if you forget your password. 8. Enter your e-mail address. You will receive e-mail notification when new information is available in [address] 9. Click Sign Up. You can now view and download portions of your medical record. 10. Click the Download Summary menu link to download a portable copy of your medical information. If you have questions, please visit the Frequently Asked Questions section of the [employer] website. Remember, [employer] is NOT to be used for urgent needs. For medical emergencies, dial 911. Now available from your iPhone and Android! Please provide this summary of care documentation to your next provider. Your primary care clinician is listed as NONE. If you have any questions after today's visit, please call [phone].

## 2018-08-30 NOTE — PROGRESS NOTES
Cardiovascular Specialists    Mr. Yuriy Dupree is a 46year old male with a history of atrial fibrillation, CVA, hypertension, hyperlipidemia and obesity. Mr. Yuriy Dupree is here to establish care with me. Mr. Yuriy Dupree was diagnosed with atrial fibrillation in 1997. Initially he was not compliant with his medication, especially Coumadin. In 2009 he had a stroke. Since then he's been taking his Coumadin regularly. He has recently relocated to this area from [place] in June 2018. He is in here in follow up post echocardiogram and has no complaints. Denies chest pain, LE edema, orthopnea, PND, palpitations, syncope or near syncope. Taking his medications without issue, no bleeding on Coumadin. Denies any nausea, vomiting, abdominal pain, fever, chills, sputum production. No hematuria or other bleeding complaints    Past Medical History:   Diagnosis Date    Atrial fibrillation ([facility]     CVA (cerebral vascular accident) ([facility] 2009    HLD (hyperlipidemia)     HTN (hypertension)     Obesity          No past surgical history on file. Current Outpatient Prescriptions   Medication Sig    carvedilol (COREG) 25 mg tablet 12.5 mg two (2) times a day.  hydrALAZINE (APRESOLINE) 25 mg tablet three (3) times daily.  hydroCHLOROthiazide (HYDRODIURIL) 25 mg tablet     isosorbide dinitrate (ISORDIL) 20 mg tablet 20 mg three (3) times daily.  lisinopril (PRINIVIL, ZESTRIL) 40 mg tablet     pravastatin (PRAVACHOL) 40 mg tablet     warfarin (COUMADIN) 2.5 mg tablet     aspirin delayed-release 81 mg tablet Take  by mouth daily. No current facility-administered medications for this visit. Allergies and Sensitivities:  No Known Allergies    Family History:  No family history on file.     Social History:  Social History   Substance Use Topics    Smoking status: Unknown If Ever Smoked    Smokeless tobacco: Never Used    Alcohol use None     He has an unknown smoking status. He has never used smokeless tobacco.  He  has no alcohol history on file. Review of Systems:  Cardiac symptoms as noted above in HPI. All others negative. Denies fatigue, malaise, skin rash, joint pain, blurring vision, photophobia, neck pain, hemoptysis, chronic cough, nausea, vomiting, hematuria, burning micturition, BRBPR, chronic headaches. Physical Exam:  BP Readings from Last 3 Encounters:   08/30/18 127/76   07/06/18 122/81   06/11/18 (!) 132/91         Pulse Readings from Last 3 Encounters:   08/30/18 81   06/11/18 72          Wt Readings from Last 3 Encounters:   08/30/18 284 lb (128.8 kg)   07/06/18 275 lb (124.7 kg)   06/11/18 284 lb (128.8 kg)       Constitutional: Oriented to person, place, and time. HENT: Head: Normocephalic and atraumatic. Eyes: Conjunctivae and extraocular motions are normal.   Neck: No JVD present. Carotid bruit is not appreciated. Cardiovascular: Irregular rhythm, rate 81. No murmur, gallop or rubs appreciated  Lung: Breath sounds normal. No respiratory distress. No ronchi or rales appreciated  Abdominal: No tenderness. No rebound and no guarding. Musculoskeletal: There is no lower extremity edema. No cynosis  Neurological: No gross motor deficit noted. noted  Psychiatric: Normal mood and affect. Good distal pulse    Review of Data  LABS:   No results found for: NA, K, CL, CO2, GLU, BUN, CREA  No flowsheet data found. No results found for: GPT, ALT  No results found for: HBA1C, HGBE8, OUX5UFCZ, BPC8HUNC, BXT1VOHQ    EKG  (06/18) A.fib at 82 bpm.     ECHO 07/06/18  · Left ventricular mild-to-moderately decreased systolic function. Estimated left ventricular ejection fraction is 41 - 45%. Calculated left ventricular ejection fraction is 60%. Left ventricular mild concentric hypertrophy observed. Left ventricular global hypokinesis. · Left atrial cavity size is mildly dilated. · Mild mitral valve regurgitation.   · Mildly elevated central venous pressure (5-10 mmHg); IVC diameter is less than 21 mm and collapses less than 50% with respiration. IMPRESSION & PLAN:  Mr. Nirav Kim is a 46year old male with a history of atrial fibrillation, CVA, hypertension, hyperlipidemia and obesity. Atrial fibrillation: This was diagnosed in 1997. This was complicated by a stroke in 2009 as patient was not compliant with Coumadin initially. INR today is 1.9. He is in controlled afib on exam today, continue with Coreg. Refills sent to pharmacy. Hypertension: His blood pressure is 127/73 mmHg. He is on Coreg, Hydralazine, Hydrochlorothiazide and Lisinopril, as well as Isosorbide. Continue with this regimen. Mildly reduced ejection fraction: An echocardiogram was completed, which shows estimated EF 41-45%. He is on appropriate cardiac regimen as above, no volume overload. Discussed adherence to low salt regimen, important of diet and exercise. He states he has lost 4 pound with making some dietary changes. Hyperlipidemia:  He is on Pravastatin 40 mg daily. He has not completed fasting lipid panel since last visit, another one will be ordered. Obesity:  Mr. Nirav Kim weighs 284 lbs with BMI of 38. He is attempting weight loss via diet and exercise, encouraged him to continue with weight loss. Subjective report of 4 lbs of weight loss. Importance of diet and exercise was discussed with patient. This plan was discussed with patient who is in agreement. Thank you for allowing me to participate in patient care. Please feel free to call me if you have any question or concern.      Stevenson Gaucher, PA-C

## 2018-08-30 NOTE — PATIENT INSTRUCTIONS
The INR is below the therapeutic range. Please make the following adjustments to Coumadin dosing:Take 7.5mg on this Sunday.  Then resume normal dosage of 7.5 daily and then 5 mg on sundays  Repeat the INR in 1 month

## 2018-08-30 NOTE — MR AVS SNAPSHOT
[address] [facility] [address] [address] 
[phone] Patient: Chacho Tolbert MRN: [id number] :1965 Visit Information Date & Time Provider Department Dept. Phone Encounter #  
 2018  8:40 AM Pt Inr Norf Csi Cardio Specialist at [place] [id number] [phone] Follow-up Instructions Return in about 1 month (around 2018). Follow-up and Disposition History Your Appointments 2018  9:20 AM  
ANTICOAG NURSE with Pt Inr Norf Csi Cardio Specialist at [facility] Appt Note: one month [facility] [address] [address] [address] [place] [address]  
  
    
 2018  9:45 AM  
Follow Up with Campos Ramos MD  
Cardio Specialist at [facility] Appt Note: 3 months follow up  
 [place] [address] [address] [place] [address] Upcoming Health Maintenance Date Due Hepatitis C Screening 1965 DTaP/Tdap/Td series (1 - Tdap) 1986 FOBT Q 1 YEAR AGE 50-75 2015 MEDICARE YEARLY EXAM 2018 Influenza Age 5 to Adult 2018 Allergies as of 2018  Review Complete On: 2018 By: Thierry Mak LPN No Known Allergies Current Immunizations  Never Reviewed No immunizations on file. Not reviewed this visit You Were Diagnosed With   
  
 Codes Comments Atrial fibrillation, unspecified type ([facility]     ICD-10-CM: I48.91 
ICD-9-CM: 427.31 Vitals Smoking Status Unknown If Ever Smoked Your Updated Medication List  
  
   
This list is accurate as of 18  9:03 AM.  Always use your most recent med list.  
  
  
  
  
 aspirin delayed-release 81 mg tablet Take  by mouth daily. carvedilol 25 mg tablet Commonly known as:  COREG  
12.5 mg two (2) times a day. hydrALAZINE 25 mg tablet Commonly known as:  APRESOLINE  
three (3) times daily. hydroCHLOROthiazide 25 mg tablet Commonly known as:  HYDRODIURIL  
  
 isosorbide dinitrate 20 mg tablet Commonly known as:  ISORDIL  
20 mg three (3) times daily. lisinopril 40 mg tablet Commonly known as:  PRINIVIL, ZESTRIL  
  
 pravastatin 40 mg tablet Commonly known as:  PRAVACHOL  
  
 warfarin 2.5 mg tablet Commonly known as:  COUMADIN Description Verbal order and read back per Dr. Burnetta Goltz Take 7.5mg on this Sunday. Then resume normal dosage of 7.5 daily and then 5 mg on sundays August 2018 Details Sun Mon Tue Wed Thu Fri Sat  
     1  
  
  
  
   2  
  
  
  
   3  
  
  
  
   4  
  
  
  
  
  5  
  
  
  
   6  
  
  
  
   7  
  
  
  
   8  
  
  
  
   9  
  
  
  
   10  
  
  
  
   11  
  
  
  
  
  12  
  
  
  
   13  
  
  
  
   14  
  
  
  
   15  
  
  
  
   16  
  
  
  
   17  
  
  
  
   18  
  
  
  
  
  19  
  
  
  
   20  
  
  
  
   21  
  
  
  
   22  
  
  
  
   23  
  
  
  
   24  
  
  
  
   25  
  
  
  
  
  26  
  
  
  
   27  
  
  
  
   28  
  
  
  
   29  
  
  
  
   30  
  
7.5 mg  
See details 31  
  
7.5 mg Date Details 08/30 This INR check INR: 1.9! How to take your warfarin dose To take:  7.5 mg Take three of the 2.5 mg tablets. September 2018 Details Irene Whalen Tue Wed Thu Fri Sat  
        1  
  
7.5 mg  
  
  
  2  
  
7.5 mg  
  
   3  
  
7.5 mg  
  
   4  
  
7.5 mg  
  
   5  
  
7.5 mg  
  
   6  
  
7.5 mg  
  
   7  
  
7.5 mg  
  
   8  
  
7.5 mg  
  
  
  9  
  
5 mg  
  
   10  
  
7.5 mg  
  
   11  
  
7.5 mg  
  
   12  
  
7.5 mg  
  
   13  
  
7.5 mg  
  
   14  
  
7.5 mg  
  
   15  
  
7.5 mg  
  
  
  16  
  
5 mg  
  
   17  
  
7.5 mg  
  
   18  
  
7.5 mg  
  
   19  
  
7.5 mg  
  
   20 7.5 mg  
  
   21  
  
7.5 mg  
  
   22  
  
7.5 mg  
  
  
  23  
  
5 mg  
  
   24  
  
7.5 mg  
  
   25  
  
  
  
   26  
  
  
  
   27  
  
  
  
   28  
  
  
  
   29  
  
  
  
  
  30  
  
  
  
        
 Date Details No additional details Date of next INR:  9/24/2018 How to take your warfarin dose To take:  5 mg Take two of the 2.5 mg tablets. To take:  7.5 mg Take three of the 2.5 mg tablets. We Performed the Following AMB POC PT/INR [82940 CPT(R)] Follow-up Instructions Return in about 1 month (around 9/30/2018). Patient Instructions The INR is below the therapeutic range. Please make the following adjustments to Coumadin dosing:Take 7.5mg on this Sunday. Then resume normal dosage of 7.5 daily and then 5 mg on sundays Repeat the INR in 1 month Patient Instructions History Introducing [facility]! [employer] introduces [employer] patient portal. Now you can access parts of your medical record, email your doctor's office, and request medication refills online. 1. In your internet browser, go to [email or web address] 2. Click on the First Time User? Click Here link in the Sign In box. You will see the New Member Sign Up page. 3. Enter your [employer] Access Code exactly as it appears below. You will not need to use this code after youve completed the sign-up process. If you do not sign up before the expiration date, you must request a new code. · [employer] Access Code: [email or web address] Expires: 9/9/2018 10:13 AM 
 
4. Enter the last four digits of your Social Security Number (xxxx) and Date of Birth (mm/dd/yyyy) as indicated and click Submit. You will be taken to the next sign-up page. 5. Create a "[employer]t ID. This will be your [employer] login ID and cannot be changed, so think of one that is secure and easy to remember. 6. Create a "[employer]t password. You can change your password at any time. 7. Enter your Password Reset Question and Answer. This can be used at a later time if you forget your password. 8. Enter your e-mail address. You will receive e-mail notification when new information is available in [address] 9. Click Sign Up. You can now view and download portions of your medical record. 10. Click the Download Summary menu link to download a portable copy of your medical information. If you have questions, please visit the Frequently Asked Questions section of the [employer] website. Remember, [employer] is NOT to be used for urgent needs. For medical emergencies, dial 911. Now available from your iPhone and Android! Please provide this summary of care documentation to your next provider. Your primary care clinician is listed as NONE. If you have any questions after today's visit, please call [phone].

## 2018-09-04 RX ORDER — CARVEDILOL 25 MG/1
12.5 TABLET ORAL 2 TIMES DAILY
Qty: 180 TAB | Refills: 3 | Status: SHIPPED | OUTPATIENT
Start: 2018-09-04 | End: 2018-11-20 | Stop reason: ALTCHOICE

## 2018-09-04 NOTE — TELEPHONE ENCOUNTER
Last appt: 8/30/2018  Future Appointments  Date Time Provider Terrance Cortezi   9/20/2018 9:20 AM Pt Inr Norf Csi [facility]   11/20/2018 9:45 AM Saumil Loy Dancer,  [facility]       Requested Prescriptions     Pending Prescriptions Disp Refills    carvedilol (COREG) 25 mg tablet 180 Tab 3     Sig: Take 0.5 Tabs by mouth two (2) times a day. Pharm[facility]

## 2018-09-20 ENCOUNTER — ANTI-COAG VISIT (OUTPATIENT)
Dept: CARDIOLOGY CLINIC | Age: 53
End: 2018-09-20

## 2018-09-20 DIAGNOSIS — I48.91 ATRIAL FIBRILLATION, UNSPECIFIED TYPE (HCC): ICD-10-CM

## 2018-09-20 LAB
INR BLD: 2.1
PT POC: NORMAL SECONDS
VALID INTERNAL CONTROL?: YES

## 2018-09-20 NOTE — MR AVS SNAPSHOT
[address] [facility] [address] [address] [address] 
[phone] Patient: Nicky Kraft MRN: [id number] :1965 Visit Information Date & Time Provider Department Dept. Phone Encounter #  
 2018  9:20 AM Pt Inr Norf Csi Cardio Specialist at [place] [address] [id number] Your Appointments 2018  9:20 AM  
ANTICOAG NURSE with Pt Inr Norf Csi Cardio Specialist at [facility]) Appt Note: one month [facility] [address] [address] [place] [address]  
  
    
 10/22/2018  9:20 AM  
ANTICOAG NURSE with Pt Inr Norf Csi Cardio Specialist at [facility]) Appt Note: 4 weeks [facility] [address] [address]  
[phone]  
  
    
 2018  9:45 AM  
Follow Up with Sudeep Johnston MD  
Cardio Specialist at [facility]) Appt Note: 3 months follow up  
 [employer] [address] [place] [address] Upcoming Health Maintenance Date Due Hepatitis C Screening 1965 DTaP/Tdap/Td series (1 - Tdap) 1986 FOBT Q 1 YEAR AGE 50-75 2015 MEDICARE YEARLY EXAM 2018 Influenza Age 5 to Adult 2018 Allergies as of 2018  Review Complete On: 2018 By: Eduardo Linton. Erma Davila PA-C No Known Allergies Current Immunizations  Never Reviewed No immunizations on file. Not reviewed this visit You Were Diagnosed With   
  
 Codes Comments Atrial fibrillation, unspecified type ([facility]     ICD-10-CM: I48.91 
ICD-9-CM: 427.31 Vitals Smoking Status Unknown If Ever Smoked Preferred Pharmacy Pharmacy Name Phone [address], [address] [facility] [phone] Your Updated Medication List  
  
   
This list is accurate as of 9/20/18  9:17 AM.  Always use your most recent med list.  
  
  
  
  
 aspirin delayed-release 81 mg tablet Take  by mouth daily. carvedilol 25 mg tablet Commonly known as:  Sharolychan Mar Take 0.5 Tabs by mouth two (2) times a day. hydrALAZINE 25 mg tablet Commonly known as:  APRESOLINE  
three (3) times daily. hydroCHLOROthiazide 25 mg tablet Commonly known as:  HYDRODIURIL  
  
 isosorbide dinitrate 20 mg tablet Commonly known as:  ISORDIL  
20 mg three (3) times daily. lisinopril 40 mg tablet Commonly known as:  PRINIVIL, ZESTRIL  
  
 pravastatin 40 mg tablet Commonly known as:  PRAVACHOL  
  
 warfarin 2.5 mg tablet Commonly known as:  COUMADIN Description Verbal order and read back per Dr. Cardenas Model Then resume normal dosage of 7.5 daily and then 5 mg on Sundays September 2018 Details Sun Mon Tue Wed Thu Fri Sat  
        1  
  
  
  
  
  2  
  
  
  
   3  
  
  
  
   4  
  
  
  
   5  
  
  
  
   6  
  
  
  
   7  
  
  
  
   8  
  
  
  
  
  9  
  
  
  
   10  
  
  
  
   11  
  
  
  
   12  
  
  
  
   13  
  
  
  
   14  
  
  
  
   15  
  
  
  
  
  16  
  
  
  
   17  
  
  
  
   18  
  
  
  
   19  
  
  
  
   20  
  
7.5 mg  
See details 21  
  
7.5 mg  
  
   22  
  
7.5 mg  
  
  
  23  
  
5 mg  
  
   24  
  
7.5 mg  
  
   25  
  
7.5 mg  
  
   26  
  
7.5 mg  
  
   27  
  
7.5 mg  
  
   28  
  
7.5 mg  
  
   29  
  
7.5 mg  
  
  
  30  
  
5 mg Date Details 09/20 This INR check INR: 2.1 How to take your warfarin dose To take:  5 mg Take two of the 2.5 mg tablets. To take:  7.5 mg Take three of the 2.5 mg tablets. October 2018 Details [facility] Tue Wed Thu Fri Sat  
   1  
  
7.5 mg  
  
   2  
  
7.5 mg  
  
   3  
 7.5 mg  
  
   4  
  
7.5 mg  
  
   5  
  
7.5 mg  
  
   6  
  
7.5 mg  
  
  
  7  
  
5 mg  
  
   8  
  
7.5 mg  
  
   9  
  
7.5 mg  
  
   10  
  
7.5 mg  
  
   11  
  
7.5 mg  
  
   12  
  
7.5 mg  
  
   13  
  
7.5 mg  
  
  
  14  
  
5 mg  
  
   15  
  
7.5 mg  
  
   16  
  
7.5 mg  
  
   17  
  
7.5 mg  
  
   18  
  
7.5 mg  
  
   19  
  
  
  
   20  
  
  
  
  
  21  
  
  
  
   22  
  
  
  
   23  
  
  
  
   24  
  
  
  
   25  
  
  
  
   26  
  
  
  
   27  
  
  
  
  
  28  
  
  
  
   29  
  
  
  
   30  
  
  
  
   31  
  
  
  
     
 Date Details No additional details Date of next INR:  10/18/2018 How to take your warfarin dose To take:  5 mg Take two of the 2.5 mg tablets. To take:  7.5 mg Take three of the 2.5 mg tablets. We Performed the Following AMB POC PT/INR [82496 CPT(R)] Introducing [facility] SERVICES! Dear Braydon Mireles: Thank you for requesting a [employer] account. Our records indicate that you already have an active [employer] account. You can access your account anytime at [email or web address] Did you know that you can access your hospital and ER discharge instructions at any time in [employer]? You can also review all of your test results from your hospital stay or ER visit. Additional Information If you have questions, please visit the Frequently Asked Questions section of the [employer] website at [email or web address] Remember, [employer] is NOT to be used for urgent needs. For medical emergencies, dial 911. Now available from your iPhone and Android! Please provide this summary of care documentation to your next provider. Your primary care clinician is listed as NONE. If you have any questions after today's visit, please call [phone].

## 2018-10-23 ENCOUNTER — ANTI-COAG VISIT (OUTPATIENT)
Dept: CARDIOLOGY CLINIC | Age: 53
End: 2018-10-23

## 2018-10-23 DIAGNOSIS — I48.91 ATRIAL FIBRILLATION, UNSPECIFIED TYPE (HCC): ICD-10-CM

## 2018-10-23 LAB
INR BLD: 3.4
PT POC: 41.2 SECONDS
VALID INTERNAL CONTROL?: YES

## 2018-10-23 NOTE — PROGRESS NOTES
The INR is above the therapeutic range. Ask the patient about bleeding complications.   Please make the following adjustments to Coumadin dosing: Hold today Then resume normal dosage of 7.5 daily and then 5 mg on Sundays  Recheck in 1 week

## 2018-10-26 DIAGNOSIS — Z79.01 LONG TERM CURRENT USE OF ANTICOAGULANT THERAPY: ICD-10-CM

## 2018-10-26 DIAGNOSIS — I48.20 CHRONIC ATRIAL FIBRILLATION: ICD-10-CM

## 2018-10-28 RX ORDER — WARFARIN 2.5 MG/1
TABLET ORAL
Qty: 100 TABLET | Refills: 3 | OUTPATIENT
Start: 2018-10-28

## 2018-10-30 ENCOUNTER — ANTI-COAG VISIT (OUTPATIENT)
Dept: CARDIOLOGY CLINIC | Age: 53
End: 2018-10-30

## 2018-10-30 DIAGNOSIS — I48.91 ATRIAL FIBRILLATION, UNSPECIFIED TYPE (HCC): Primary | ICD-10-CM

## 2018-10-30 LAB
INR BLD: 1.4
PT POC: NORMAL SECONDS
VALID INTERNAL CONTROL?: YES

## 2018-10-30 RX ORDER — WARFARIN 2.5 MG/1
2.5 TABLET ORAL DAILY
Qty: 100 TAB | Refills: 8 | Status: SHIPPED | OUTPATIENT
Start: 2018-10-30 | End: 2021-12-01

## 2018-10-30 NOTE — PROGRESS NOTES
The INR is below the therapeutic range. Please make the following adjustments to Coumadin dosing:. Repeat the INR in Verbal order and read back per Dr. Irene Kaye Take 10mg x 2 then Then resume normal dosage of 7.5 daily and then 5 mg on Sundays Recheck in 1 week.

## 2018-11-06 ENCOUNTER — ANTI-COAG VISIT (OUTPATIENT)
Dept: CARDIOLOGY CLINIC | Age: 53
End: 2018-11-06

## 2018-11-06 DIAGNOSIS — I48.91 ATRIAL FIBRILLATION, UNSPECIFIED TYPE (HCC): Primary | ICD-10-CM

## 2018-11-06 LAB
INR BLD: 2
PT POC: NORMAL SECONDS
VALID INTERNAL CONTROL?: YES

## 2018-11-19 ENCOUNTER — TELEPHONE (OUTPATIENT)
Dept: CARDIOLOGY CLINIC | Age: 53
End: 2018-11-19

## 2018-11-19 NOTE — TELEPHONE ENCOUNTER
Patient Registration @ [facility] calling to request the pt's orders for labs be put in. Pt is waiting at registration. Fax is [phone]

## 2018-11-20 ENCOUNTER — OFFICE VISIT (OUTPATIENT)
Dept: CARDIOLOGY CLINIC | Age: 53
End: 2018-11-20

## 2018-11-20 ENCOUNTER — ANTI-COAG VISIT (OUTPATIENT)
Dept: CARDIOLOGY CLINIC | Age: 53
End: 2018-11-20

## 2018-11-20 VITALS
DIASTOLIC BLOOD PRESSURE: 73 MMHG | WEIGHT: 274 LBS | BODY MASS INDEX: 40.58 KG/M2 | HEART RATE: 73 BPM | HEIGHT: 69 IN | SYSTOLIC BLOOD PRESSURE: 128 MMHG | OXYGEN SATURATION: 98 %

## 2018-11-20 DIAGNOSIS — E66.01 OBESITY, MORBID (HCC): ICD-10-CM

## 2018-11-20 DIAGNOSIS — I48.91 ATRIAL FIBRILLATION, UNSPECIFIED TYPE (HCC): ICD-10-CM

## 2018-11-20 DIAGNOSIS — I48.19 PERSISTENT ATRIAL FIBRILLATION (HCC): ICD-10-CM

## 2018-11-20 DIAGNOSIS — I48.91 ATRIAL FIBRILLATION, UNSPECIFIED TYPE (HCC): Primary | ICD-10-CM

## 2018-11-20 DIAGNOSIS — I10 ESSENTIAL HYPERTENSION WITH GOAL BLOOD PRESSURE LESS THAN 140/90: ICD-10-CM

## 2018-11-20 DIAGNOSIS — E78.5 HYPERLIPIDEMIA, UNSPECIFIED HYPERLIPIDEMIA TYPE: ICD-10-CM

## 2018-11-20 LAB
INR BLD: 3.1
PT POC: 36.7 SECONDS
VALID INTERNAL CONTROL?: YES

## 2018-11-20 RX ORDER — ISOSORBIDE MONONITRATE 60 MG/1
TABLET, EXTENDED RELEASE ORAL
COMMUNITY
End: 2018-11-20 | Stop reason: SDUPTHER

## 2018-11-20 RX ORDER — CARVEDILOL 25 MG/1
25 TABLET ORAL 2 TIMES DAILY WITH MEALS
COMMUNITY
End: 2018-12-18 | Stop reason: SDUPTHER

## 2018-11-20 RX ORDER — ISOSORBIDE MONONITRATE 60 MG/1
60 TABLET, EXTENDED RELEASE ORAL
Qty: 90 TAB | Refills: 3 | Status: SHIPPED | OUTPATIENT
Start: 2018-11-20 | End: 2019-11-04 | Stop reason: DRUGHIGH

## 2018-11-20 RX ORDER — BLOOD PRESSURE TEST KIT-MEDIUM
1 KIT MISCELLANEOUS DAILY
Qty: 1 KIT | Refills: 0 | Status: SHIPPED | OUTPATIENT
Start: 2018-11-20 | End: 2021-12-02

## 2018-11-20 NOTE — PROGRESS NOTES
The INR is above the therapeutic range. Ask the patient about bleeding complications.   Please make the following adjustments to Coumadin dosing: Verbal order and read back per Dr. Kendall Mendoza today then resume 7.5 daily and then 5 mg on Sundays  Recheck in 2 weeks

## 2018-11-20 NOTE — PROGRESS NOTES
[facility]    Mr. Leticia Mar is a 46 y.o. male with a history of atrial fibrillation, CVA, hypertension, hyperlipidemia and obesity. Mr. Leticia Mar is here today for follow up appointment. He denies any new symptoms since last visit. He is taking all his medications regularly. He denies any chest pain or chest tightness. He denies any PND or lower extremity swelling. He admits that he snores heavily and sometimes feels daytime fatigue and tiredness. He denies any lower extremity swelling, presyncope or syncope. He sometimes can feel that his atrial fibrillation is acting up, however he has no associated symptoms. Past Medical History:   Diagnosis Date    [facility]     Failed cardioversion X 2 in past.     Cardiomyopathy ([facility]     LVEF 40-45% (07/18)    CVA (cerebral vascular accident) ([facility] 2009    HLD (hyperlipidemia)     HTN (hypertension)     Obesity          No past surgical history on file. Current Outpatient Medications   Medication Sig    isosorbide mononitrate ER (IMDUR) 60 mg CR tablet Take  by mouth every morning.  Blood Pressure Kit-Extra Large kit 1 Kit by Does Not Apply route daily.  warfarin (COUMADIN) 2.5 mg tablet Take 1 Tab by mouth daily.  carvedilol (COREG) 25 mg tablet Take 0.5 Tabs by mouth two (2) times a day.  hydrALAZINE (APRESOLINE) 25 mg tablet 25 mg daily.  hydroCHLOROthiazide (HYDRODIURIL) 25 mg tablet     lisinopril (PRINIVIL, ZESTRIL) 40 mg tablet     pravastatin (PRAVACHOL) 40 mg tablet     aspirin delayed-release 81 mg tablet Take  by mouth daily. No current facility-administered medications for this visit. Allergies and Sensitivities:  No Known Allergies    Family History:  No family history on file.     Social History:  Social History     Tobacco Use    Smoking status: Unknown If Ever Smoked    Smokeless tobacco: Never Used   Substance Use Topics    Alcohol use: Not on file    Drug use: Not on file     He  has an unknown smoking status. he has never used smokeless tobacco.  He  has no alcohol history on file. Review of Systems:  Cardiac symptoms as noted above in HPI. All others negative. Denies fatigue, malaise, skin rash, joint pain, blurring vision, photophobia, neck pain, hemoptysis, chronic cough, nausea, vomiting, hematuria, burning micturition, BRBPR, chronic headaches. Physical Exam:  BP Readings from Last 3 Encounters:   11/20/18 128/73   08/30/18 127/76   07/06/18 122/81         Pulse Readings from Last 3 Encounters:   11/20/18 73   08/30/18 81   06/11/18 72          Wt Readings from Last 3 Encounters:   11/20/18 274 lb (124.3 kg)   08/30/18 284 lb (128.8 kg)   07/06/18 275 lb (124.7 kg)       Constitutional: Oriented to person, place, and time. HENT: Head: Normocephalic and atraumatic. Neck: No JVD present. Carotid bruit is not appreciated. Cardiovascular: Irregular rhythm. No murmur, gallop or rubs appreciated  Lung: Breath sounds normal. No respiratory distress. No ronchi or rales appreciated  Abdominal: No tenderness. No rebound and no guarding. Musculoskeletal: There is no lower extremity edema. No cynosis    Review of Data  LABS:   No results found for: NA, K, CL, CO2, GLU, BUN, CREA  No flowsheet data found. No results found for: GPT, ALT  No results found for: HBA1C, HGBE8, GOX2FYWA, NHJ5YDQW, ASD6UOHO    EKG  (06/18) A.fib at 82 bpm.     ECHO    IMPRESSION & PLAN:  Mr. Samara Muhammad is a 46 y.o. male with a history of atrial fibrillation, CVA, hypertension, hyperlipidemia and obesity. Atrial fibrillation: This was diagnosed in 1997. This was complicated by a stroke in 2009 as patient was not compliant with Coumadin initially. Has failed cardioversion X 2 in past per patient. Currently he's on aspirin and Coumadin. INR today is 3.1. Denies any peripheral or coronary artery disease and coronary stent. Will stop ASA.  Continue coumadin. His heart rate is well controlled. Currently he is taking Coreg. Cardiomyopathy:  LVEF 40-45% in 07/18. No fluid overload on exam.   Likely form a.fib and tachycardia. NYHA Class I/II    Hypertension: His blood pressure is 128/73 mmHg. He is on Coreg, Hydralazine, Hydrochlorothiazide and Lisinopril, as well as Isosorbide. Will try to consolidate BP meds. Increase coreg to 25 mg BID  Reducing hydralazine 25 mg daily ( with goal to stop it if allows)  Change isordil to long acting imdur 60 mg daily  Continue HCTZ  BP check in 2 weeks after above changes. Hyperlipidemia:  He is on Pravastatin 40 mg daily. We will obtain fasting lipid profile before next visit. I would recommend to continue same. Obesity:  Mr. León Fernandez weighs 284--->274 lbs. Goal weight loss of 50 lbs has been discussed. He is going to start working out. Will evaluate for sleep apnea. Snoring. Day time fatigue. Especially a.fib    Importance of diet and exercise was discussed with patient. This plan was discussed with patient who is in agreement. Thank you for allowing me to participate in patient care. Please feel free to call me if you have any question or concern. Carlton Bernheim, MD  Please note: This document has been produced using voice recognition software. Unrecognized errors in transcription may be present.

## 2018-11-20 NOTE — PATIENT INSTRUCTIONS
Decrease Hydralazine to 25 mg daily  Start taking Coreg 25 mg twice a day  Start taking Imdur 60 mg daily  Stop Isordil  Bp Check 2 weeks  BP Cuff Ordered

## 2018-11-20 NOTE — PROGRESS NOTES
1. Have you been to the ER, urgent care clinic since your last visit? Hospitalized since your last visit? No    2. Have you seen or consulted any other health care providers outside of the [address] [place] [address] since your last visit? Include any pap smears or colon screening.  No

## 2018-11-23 ENCOUNTER — DOCUMENTATION ONLY (OUTPATIENT)
Dept: CARDIOLOGY CLINIC | Age: 53
End: 2018-11-23

## 2018-11-23 NOTE — PROGRESS NOTES
Faxed signed referral to [facility] at [phone] on 11/21/2018 by FREDERIC Ramsay LPN. Paperwork and confirmation has been placed to be scanned into patient's chart.

## 2018-12-04 ENCOUNTER — CLINICAL SUPPORT (OUTPATIENT)
Dept: CARDIOLOGY CLINIC | Age: 53
End: 2018-12-04

## 2018-12-04 ENCOUNTER — ANTI-COAG VISIT (OUTPATIENT)
Dept: CARDIOLOGY CLINIC | Age: 53
End: 2018-12-04

## 2018-12-04 VITALS — SYSTOLIC BLOOD PRESSURE: 135 MMHG | DIASTOLIC BLOOD PRESSURE: 69 MMHG

## 2018-12-04 DIAGNOSIS — Z79.899 MEDICATION THERAPY CHANGED: Primary | ICD-10-CM

## 2018-12-04 DIAGNOSIS — I48.91 ATRIAL FIBRILLATION, UNSPECIFIED TYPE (HCC): ICD-10-CM

## 2018-12-04 LAB
INR BLD: 2.4
PT POC: 29.1 SECONDS
VALID INTERNAL CONTROL?: YES

## 2018-12-04 NOTE — PROGRESS NOTES
Mariposa Borja is a 46 y.o. male that is here for a blood pressure check. His current medications are listed below. Current Outpatient Medications   Medication Sig    Blood Pressure Kit-Extra Large kit 1 Kit by Does Not Apply route daily.  isosorbide mononitrate ER (IMDUR) 60 mg CR tablet Take 1 Tab by mouth every morning.  carvedilol (COREG) 25 mg tablet Take 25 mg by mouth two (2) times daily (with meals).  warfarin (COUMADIN) 2.5 mg tablet Take 1 Tab by mouth daily.  hydrALAZINE (APRESOLINE) 25 mg tablet 25 mg daily.  hydroCHLOROthiazide (HYDRODIURIL) 25 mg tablet     lisinopril (PRINIVIL, ZESTRIL) 40 mg tablet     pravastatin (PRAVACHOL) 40 mg tablet     aspirin delayed-release 81 mg tablet Take  by mouth daily. No current facility-administered medications for this visit.                 His   Visit Vitals  /69

## 2018-12-18 ENCOUNTER — ANTI-COAG VISIT (OUTPATIENT)
Dept: CARDIOLOGY CLINIC | Age: 53
End: 2018-12-18

## 2018-12-18 DIAGNOSIS — I48.91 ATRIAL FIBRILLATION, UNSPECIFIED TYPE (HCC): ICD-10-CM

## 2018-12-18 LAB
INR BLD: 2.1
PT POC: 25.8 SECONDS
VALID INTERNAL CONTROL?: YES

## 2018-12-18 RX ORDER — CARVEDILOL 25 MG/1
25 TABLET ORAL 2 TIMES DAILY WITH MEALS
Qty: 180 TAB | Refills: 3 | Status: SHIPPED | OUTPATIENT
Start: 2018-12-18 | End: 2019-11-04 | Stop reason: DRUGHIGH

## 2018-12-18 NOTE — TELEPHONE ENCOUNTER
PCP: None    Last appt: 12/4/2018  Future Appointments   Date Time Provider Terrance Azra   1/8/2019  9:10 AM Csi, Pt Inr Norf [address]       Requested Prescriptions     Pending Prescriptions Disp Refills    carvedilol (COREG) 25 mg tablet 180 Tab 3     Sig: Take 1 Tab by mouth two (2) times daily (with meals).

## 2019-01-08 ENCOUNTER — ANTI-COAG VISIT (OUTPATIENT)
Dept: CARDIOLOGY CLINIC | Age: 54
End: 2019-01-08

## 2019-01-08 DIAGNOSIS — I48.91 ATRIAL FIBRILLATION, UNSPECIFIED TYPE (HCC): ICD-10-CM

## 2019-01-08 LAB
INR BLD: 1.2
PT POC: 14.5 SECONDS
VALID INTERNAL CONTROL?: YES

## 2019-01-08 RX ORDER — WARFARIN SODIUM 5 MG/1
5 TABLET ORAL DAILY
Qty: 30 TAB | Refills: 6 | Status: SHIPPED | OUTPATIENT
Start: 2019-01-08 | End: 2021-12-01

## 2019-01-08 NOTE — PROGRESS NOTES
The INR is below the therapeutic range. Please make the following adjustments to Coumadin dosing: Take 10 mg today and tomorrow then Continue 7.5 daily and then 5 mg on Sundays. Recheck in 1 week.

## 2019-01-08 NOTE — TELEPHONE ENCOUNTER
PCP: None    Last appt: 12/4/2018  Future Appointments   Date Time Provider Terrance Oquendo   1/8/2019  9:10 AM Csi, Pt Inr Norf [address] [facility] [address]       Requested Prescriptions     Pending Prescriptions Disp Refills    warfarin (COUMADIN) 5 mg tablet 30 Tab 6     Sig: Take 1 Tab by mouth daily.

## 2019-03-07 NOTE — PROGRESS NOTES
Called patient 3 times including his emergency contact. Left voicemail on both numbers. Will send out an appt letter.

## 2019-03-22 NOTE — PROGRESS NOTES
Multiple attempts have been made to contact this patient. He has not returned our phone calls or letters. A letter of intent to discharge will be sent to the patient and the enrolling MD.

## 2019-03-25 NOTE — PROGRESS NOTES
I was informed by Dr. [facility] that Mr. Jaeger is now being seen by Dr. NAVEED Noe. I sent a message to Dr. Noe explaining that Mr. Jaeger has not followed up with the coumadin clinic nor has he responded to our phone calls.

## 2019-03-29 ENCOUNTER — ANTI-COAG VISIT (OUTPATIENT)
Dept: CARDIOLOGY | Facility: CLINIC | Age: 54
End: 2019-03-29

## 2019-03-29 DIAGNOSIS — I48.21 PERMANENT ATRIAL FIBRILLATION: ICD-10-CM

## 2019-03-29 DIAGNOSIS — Z79.01 LONG TERM (CURRENT) USE OF ANTICOAGULANTS: ICD-10-CM

## 2019-03-29 NOTE — PROGRESS NOTES
The Coumadin Clinic staff has tried to reach the patient by telephone on 12/6/18, 2/8/19, 2/28/19 and 3/7  A missed letter was sent to the patient's address on file on 3/22 , in addition to notifying the enrolling physician.  The patient has not responded to any of the previous correspondence.  Therefore, I am now discharging the patient from the Coumadin Clinic, per our missed protocol.  This discharge information will be sent to the patients enrolling physician and a letter with this information will be sent to the patient.

## 2019-03-29 NOTE — LETTER
[facility]  [address]  [facility] [address]  Phone: [phone]  Fax: [phone]     2019         Casandra Jaeger  [address]  [address] [place] [id number]    Patient: Casandra Jaeger  MRN: [id number]  : 1965    Dear Mr Casandra Jaeger:     The Coumadin Clinic has been unsuccessful in getting in touch with you regarding your missed appointment for a PT/INR check.  Your appointment is overdue.  Despite phone calls and a previous letter sent to your address on file, you have not called our clinic to reschedule your appointment.  It is very important that you are monitored regularly while on Coumadin (warfarin).     You have been discharged from the Coumadin Clinic.  Your physician has been notified of your discharge.  If you still require monitoring for your Coumadin therapy please call your physician immediately so that you can resume regular monitoring.  If the physician recommends that our clinic continue to monitor your Coumadin therapy, a new enrollment form will be required before we can resume care.  If you are no longer on Coumadin or no longer require monitoring by our clinic, please contact our clinic so we may update our records.     It has been a pleasure providing your care.      Sincerely,  [facility]

## 2019-11-04 PROBLEM — I50.9 CHF (CONGESTIVE HEART FAILURE) (HCC): Status: ACTIVE | Noted: 2019-11-04

## 2019-11-04 PROBLEM — J81.1 PULMONARY EDEMA: Status: ACTIVE | Noted: 2019-11-04

## 2019-11-04 PROBLEM — I48.91 ATRIAL FIBRILLATION WITH RVR (HCC): Status: ACTIVE | Noted: 2019-11-04

## 2019-11-04 PROBLEM — I48.91 ATRIAL FIBRILLATION WITH RAPID VENTRICULAR RESPONSE (HCC): Status: ACTIVE | Noted: 2019-11-04

## 2019-11-04 PROBLEM — R77.8 ELEVATED TROPONIN: Status: ACTIVE | Noted: 2019-11-04

## 2021-08-03 PROBLEM — I48.91 ATRIAL FIBRILLATION WITH RAPID VENTRICULAR RESPONSE (HCC): Status: RESOLVED | Noted: 2019-11-04 | Resolved: 2021-08-03

## 2021-08-03 PROBLEM — I48.91 AFIB (HCC): Status: RESOLVED | Noted: 2018-06-11 | Resolved: 2021-08-03

## 2021-12-01 PROBLEM — R06.00 DYSPNEA: Status: ACTIVE | Noted: 2021-12-01

## 2021-12-01 PROBLEM — I50.9 ACUTE CONGESTIVE HEART FAILURE (HCC): Status: ACTIVE | Noted: 2021-12-01

## 2022-07-01 ENCOUNTER — OCCUPATIONAL HEALTH (OUTPATIENT)
Dept: URGENT CARE | Facility: CLINIC | Age: 57
End: 2022-07-01

## 2022-07-01 DIAGNOSIS — Z02.1 PRE-EMPLOYMENT EXAMINATION: Primary | ICD-10-CM

## 2022-07-01 LAB — BREATH ALCOHOL: 0

## 2022-07-01 PROCEDURE — 80305 OOH NON-DOT DRUG SCREEN: ICD-10-PCS | Mod: S$GLB,,, | Performed by: NURSE PRACTITIONER

## 2022-07-01 PROCEDURE — 99499 PHYSICAL, BASIC COMPLEXITY: ICD-10-PCS | Mod: S$GLB,,, | Performed by: NURSE PRACTITIONER

## 2022-07-01 PROCEDURE — 80305 DRUG TEST PRSMV DIR OPT OBS: CPT | Mod: S$GLB,,, | Performed by: NURSE PRACTITIONER

## 2022-07-01 PROCEDURE — 99499 UNLISTED E&M SERVICE: CPT | Mod: S$GLB,,, | Performed by: NURSE PRACTITIONER

## 2022-07-01 PROCEDURE — 82075 POCT BREATH ALCOHOL TEST: ICD-10-PCS | Mod: S$GLB,,, | Performed by: NURSE PRACTITIONER

## 2022-07-01 PROCEDURE — 82075 ASSAY OF BREATH ETHANOL: CPT | Mod: S$GLB,,, | Performed by: NURSE PRACTITIONER
